# Patient Record
Sex: MALE | Race: WHITE | NOT HISPANIC OR LATINO | Employment: FULL TIME | URBAN - METROPOLITAN AREA
[De-identification: names, ages, dates, MRNs, and addresses within clinical notes are randomized per-mention and may not be internally consistent; named-entity substitution may affect disease eponyms.]

---

## 2018-02-02 ENCOUNTER — HOSPITAL ENCOUNTER (EMERGENCY)
Facility: HOSPITAL | Age: 58
Discharge: HOME | End: 2018-02-03
Attending: EMERGENCY MEDICINE
Payer: COMMERCIAL

## 2018-02-02 VITALS
BODY MASS INDEX: 36.42 KG/M2 | WEIGHT: 226.63 LBS | RESPIRATION RATE: 16 BRPM | TEMPERATURE: 98 F | HEART RATE: 63 BPM | DIASTOLIC BLOOD PRESSURE: 70 MMHG | HEIGHT: 66 IN | OXYGEN SATURATION: 97 % | SYSTOLIC BLOOD PRESSURE: 127 MMHG

## 2018-02-02 DIAGNOSIS — T14.90XA TRAUMA: ICD-10-CM

## 2018-02-02 DIAGNOSIS — S22.32XA CLOSED FRACTURE OF ONE RIB OF LEFT SIDE, INITIAL ENCOUNTER: Primary | ICD-10-CM

## 2018-02-02 PROCEDURE — 63600175 PHARM REV CODE 636 W HCPCS: Performed by: EMERGENCY MEDICINE

## 2018-02-02 PROCEDURE — 99283 EMERGENCY DEPT VISIT LOW MDM: CPT | Mod: 25

## 2018-02-02 PROCEDURE — 96372 THER/PROPH/DIAG INJ SC/IM: CPT

## 2018-02-02 RX ORDER — METFORMIN HYDROCHLORIDE 750 MG/1
750 TABLET, EXTENDED RELEASE ORAL 2 TIMES DAILY
COMMUNITY

## 2018-02-02 RX ORDER — AMLODIPINE AND OLMESARTAN MEDOXOMIL 10; 40 MG/1; MG/1
1 TABLET ORAL DAILY
COMMUNITY

## 2018-02-02 RX ORDER — OXYCODONE AND ACETAMINOPHEN 10; 325 MG/1; MG/1
1 TABLET ORAL EVERY 12 HOURS PRN
Qty: 12 TABLET | Refills: 0 | Status: SHIPPED | OUTPATIENT
Start: 2018-02-02 | End: 2018-09-24

## 2018-02-02 RX ORDER — TRIAZOLAM 0.25 MG/1
0.12 TABLET ORAL NIGHTLY PRN
COMMUNITY

## 2018-02-02 RX ORDER — HYDROMORPHONE HYDROCHLORIDE 1 MG/ML
1 INJECTION, SOLUTION INTRAMUSCULAR; INTRAVENOUS; SUBCUTANEOUS
Status: COMPLETED | OUTPATIENT
Start: 2018-02-02 | End: 2018-02-02

## 2018-02-02 RX ORDER — HYDROCODONE BITARTRATE AND ACETAMINOPHEN 10; 325 MG/1; MG/1
1 TABLET ORAL EVERY 12 HOURS PRN
Qty: 12 TABLET | Refills: 0 | Status: SHIPPED | OUTPATIENT
Start: 2018-02-02 | End: 2018-02-12

## 2018-02-02 RX ORDER — ONDANSETRON 2 MG/ML
4 INJECTION INTRAMUSCULAR; INTRAVENOUS
Status: COMPLETED | OUTPATIENT
Start: 2018-02-02 | End: 2018-02-02

## 2018-02-02 RX ORDER — NEBIVOLOL 20 MG/1
TABLET ORAL NIGHTLY
COMMUNITY

## 2018-02-02 RX ADMIN — ONDANSETRON 4 MG: 2 INJECTION, SOLUTION INTRAMUSCULAR; INTRAVENOUS at 11:02

## 2018-02-02 RX ADMIN — HYDROMORPHONE HYDROCHLORIDE 1 MG: 1 INJECTION, SOLUTION INTRAMUSCULAR; INTRAVENOUS; SUBCUTANEOUS at 11:02

## 2018-02-03 NOTE — ED PROVIDER NOTES
Encounter Date: 2/2/2018       History     Chief Complaint   Patient presents with    Fall     left rib pain, pt reports missed the last step and fell into a wall approx 5 min pta     Missed a step coming down the bottom of some stairs, fell to the left and struck a brick wall with his left shoulder and upper arm, driving his left elbow into his left low anterolateral chest.  He felt and heard a pop in that area and has significant pain in that area consistent with a rib fracture.  Minor discomfort to the left upper arm and shoulder which are not bothering him much.  No other injury or complaint.  Not driving.  Worse with chest movement, deep breaths, palpation and a pattern typical for rib fracture.       The history is provided by the patient and the spouse. No  was used.     Review of patient's allergies indicates:   Allergen Reactions    Sulfa (sulfonamide antibiotics) Itching     Past Medical History:   Diagnosis Date    Diabetes mellitus     High cholesterol     Hypertension     Insomnia      Past Surgical History:   Procedure Laterality Date    CERVICAL DISCECTOMY      KNEE ARTHROSCOPY      RHINOPLASTY       History reviewed. No pertinent family history.  Social History   Substance Use Topics    Smoking status: Never Smoker    Smokeless tobacco: Never Used    Alcohol use Yes     Review of Systems   Constitutional: Negative for chills and fever.   HENT: Negative for congestion, facial swelling, nosebleeds and sinus pressure.    Eyes: Negative for pain and redness.   Respiratory: Negative for chest tightness, shortness of breath and wheezing.    Cardiovascular: Positive for chest pain. Negative for palpitations and leg swelling.   Gastrointestinal: Negative for abdominal distention, abdominal pain, diarrhea, nausea and vomiting.   Endocrine: Negative for cold intolerance, polydipsia and polyphagia.   Genitourinary: Negative for difficulty urinating, dysuria, frequency and  hematuria.   Musculoskeletal: Negative for arthralgias, back pain, myalgias and neck pain.   Skin: Negative for color change and rash.   Neurological: Negative for dizziness, weakness, numbness and headaches.   Hematological: Negative for adenopathy. Does not bruise/bleed easily.   Psychiatric/Behavioral: Negative for agitation and behavioral problems.   All other systems reviewed and are negative.      Physical Exam     Initial Vitals [02/02/18 2245]   BP Pulse Resp Temp SpO2   (!) 144/76 70 18 98.2 °F (36.8 °C) --      MAP       98.67         Physical Exam    Nursing note and vitals reviewed.  Constitutional: He appears well-developed and well-nourished. He is not diaphoretic. No distress.   HENT:   Head: Normocephalic and atraumatic.   Mouth/Throat: Oropharynx is clear and moist. No oropharyngeal exudate.   Eyes: Conjunctivae and EOM are normal. Pupils are equal, round, and reactive to light. Right eye exhibits no discharge. Left eye exhibits no discharge. No scleral icterus.   Neck: Normal range of motion. Neck supple. No thyromegaly present. No tracheal deviation present. No JVD present.   Cardiovascular: Normal rate, regular rhythm and normal heart sounds. Exam reveals no gallop and no friction rub.    No murmur heard.  Pulmonary/Chest: Breath sounds normal. No respiratory distress. He has no wheezes. He has no rhonchi. He has no rales. He exhibits tenderness.   Left low anterolateral rib tenderness   Abdominal: Soft. Bowel sounds are normal. He exhibits no distension and no mass. There is no tenderness. There is no rebound and no guarding.   Musculoskeletal: Normal range of motion. He exhibits no edema or tenderness.   Lymphadenopathy:     He has no cervical adenopathy.   Neurological: He is alert and oriented to person, place, and time. He has normal strength. No cranial nerve deficit.   Skin: Skin is warm and dry. No rash noted. No erythema.   Psychiatric: He has a normal mood and affect. His behavior is  normal. Judgment and thought content normal.         ED Course   Procedures  Labs Reviewed - No data to display     Imaging Results          X-Ray Chest PA And Lateral (Final result)  Result time 02/02/18 23:54:39    Final result by Yehuda Lange MD (02/02/18 23:54:39)                 Impression:      No acute infiltrate.      Electronically signed by: YEHUDA LANGE MD  Date:     02/02/18  Time:    23:54              Narrative:    Exam: XR CHEST PA AND LATERAL,    Date:  02/02/18 23:36:49    History: Chest injury with chest wall pain.    Comparison:  No prior relevant studies available    Findings: Mild cardiomegaly.  Decreased lung volumes.    Clear lungs.  No pleural effusion or pneumothorax.                             X-Ray Ribs 2 View Left (Final result)  Result time 02/02/18 23:53:55    Final result by Yehuda Lange MD (02/02/18 23:53:55)                 Impression:      Probable nondisplaced left 7th rib fracture.      Electronically signed by: YEHUDA LANGE MD  Date:     02/02/18  Time:    23:53              Narrative:    Exam: XR RIBS 2 VIEW LEFT,    Date:  02/02/18 23:36:49    History: Chest wall pain.    Comparison:  No prior relevant studies available    Findings: There is a nondisplaced fracture of the left posterior lateral 7th rib.  No displaced fracture is seen.  No pleural effusion or pneumothorax.                                                   ED Course      12:02 AM Improved/ counseled pt & wife in detail.    Clinical Impression:       1. Closed fracture of one rib of left side, initial encounter    2. Trauma          Disposition:   Disposition: Discharged  Condition: Stable                        Lenny Lester MD  02/02/18 [id number]       Lenny Lester MD  02/03/18 0003

## 2018-02-03 NOTE — DISCHARGE INSTRUCTIONS
_______________    Percocet is for more severe pain.    Use both medicines as little as possible.    ________________

## 2018-02-03 NOTE — ED NOTES
LOC: The patient is awake, alert and aware of environment with an appropriate affect, the patient is oriented x 3 and speaking appropriately.  APPEARANCE: Patient resting comfortably and in no acute distress, patient is clean and well groomed, patient's clothing is properly fastened.  HEENT: Brief WNL  SKIN: Brief WNL.   MUSCULOSKELETAL: Brief WNL. Except left shoulder and rib pain. No swelling or bruising noted.   RESPIRATORY: Brief WNL  CARDIAC: Brief WNL  GASTRO: Brief WNL  : Brief WNL  Peripheral Vasc: Brief WNL  NEURO: Brief WNL  PSYCH: Brief WNL

## 2018-09-24 ENCOUNTER — HOSPITAL ENCOUNTER (EMERGENCY)
Facility: HOSPITAL | Age: 58
Discharge: HOME | End: 2018-09-25
Attending: EMERGENCY MEDICINE
Payer: COMMERCIAL

## 2018-09-24 DIAGNOSIS — R51.9 SCALP PAIN: ICD-10-CM

## 2018-09-24 DIAGNOSIS — R50.9 FEVER: ICD-10-CM

## 2018-09-24 DIAGNOSIS — M54.2 NECK PAIN: ICD-10-CM

## 2018-09-24 DIAGNOSIS — L03.811 CELLULITIS OF HEAD EXCEPT FACE: Primary | ICD-10-CM

## 2018-09-24 LAB
ALBUMIN SERPL BCP-MCNC: 4 G/DL
ALP SERPL-CCNC: 98 U/L
ALT SERPL W/O P-5'-P-CCNC: 16 U/L
ANION GAP SERPL CALC-SCNC: 14 MMOL/L
AST SERPL-CCNC: 13 U/L
BASOPHILS # BLD AUTO: 0.04 K/UL
BASOPHILS NFR BLD: 0.4 %
BILIRUB SERPL-MCNC: 1.6 MG/DL
BUN SERPL-MCNC: 14 MG/DL
CALCIUM SERPL-MCNC: 10.1 MG/DL
CHLORIDE SERPL-SCNC: 98 MMOL/L
CO2 SERPL-SCNC: 25 MMOL/L
CREAT SERPL-MCNC: 1 MG/DL
DIFFERENTIAL METHOD: ABNORMAL
EOSINOPHIL # BLD AUTO: 0.1 K/UL
EOSINOPHIL NFR BLD: 1 %
ERYTHROCYTE [DISTWIDTH] IN BLOOD BY AUTOMATED COUNT: 14 %
EST. GFR  (AFRICAN AMERICAN): >60 ML/MIN/1.73 M^2
EST. GFR  (NON AFRICAN AMERICAN): >60 ML/MIN/1.73 M^2
GLUCOSE SERPL-MCNC: 194 MG/DL
HCT VFR BLD AUTO: 41.2 %
HGB BLD-MCNC: 13.7 G/DL
LACTATE SERPL-SCNC: 2.6 MMOL/L
LYMPHOCYTES # BLD AUTO: 1.3 K/UL
LYMPHOCYTES NFR BLD: 10.9 %
MCH RBC QN AUTO: 28.1 PG
MCHC RBC AUTO-ENTMCNC: 33.3 G/DL
MCV RBC AUTO: 84 FL
MONOCYTES # BLD AUTO: 1 K/UL
MONOCYTES NFR BLD: 9 %
NEUTROPHILS # BLD AUTO: 9 K/UL
NEUTROPHILS NFR BLD: 78.3 %
PLATELET # BLD AUTO: 244 K/UL
PMV BLD AUTO: 10.4 FL
POTASSIUM SERPL-SCNC: 3.7 MMOL/L
PROCALCITONIN SERPL IA-MCNC: 0.03 NG/ML
PROT SERPL-MCNC: 8 G/DL
RBC # BLD AUTO: 4.88 M/UL
SODIUM SERPL-SCNC: 137 MMOL/L
WBC # BLD AUTO: 11.42 K/UL

## 2018-09-24 PROCEDURE — 96365 THER/PROPH/DIAG IV INF INIT: CPT

## 2018-09-24 PROCEDURE — 83605 ASSAY OF LACTIC ACID: CPT

## 2018-09-24 PROCEDURE — 99285 EMERGENCY DEPT VISIT HI MDM: CPT | Mod: 25

## 2018-09-24 PROCEDURE — 96375 TX/PRO/DX INJ NEW DRUG ADDON: CPT

## 2018-09-24 PROCEDURE — 96366 THER/PROPH/DIAG IV INF ADDON: CPT

## 2018-09-24 PROCEDURE — 93010 ELECTROCARDIOGRAM REPORT: CPT | Mod: ,,, | Performed by: NUCLEAR MEDICINE

## 2018-09-24 PROCEDURE — 96368 THER/DIAG CONCURRENT INF: CPT

## 2018-09-24 PROCEDURE — 84145 PROCALCITONIN (PCT): CPT

## 2018-09-24 PROCEDURE — 80053 COMPREHEN METABOLIC PANEL: CPT

## 2018-09-24 PROCEDURE — 87086 URINE CULTURE/COLONY COUNT: CPT

## 2018-09-24 PROCEDURE — 25000003 PHARM REV CODE 250: Performed by: EMERGENCY MEDICINE

## 2018-09-24 PROCEDURE — 99900035 HC TECH TIME PER 15 MIN (STAT)

## 2018-09-24 PROCEDURE — 96367 TX/PROPH/DG ADDL SEQ IV INF: CPT

## 2018-09-24 PROCEDURE — 85025 COMPLETE CBC W/AUTO DIFF WBC: CPT

## 2018-09-24 PROCEDURE — 87040 BLOOD CULTURE FOR BACTERIA: CPT

## 2018-09-24 PROCEDURE — 81003 URINALYSIS AUTO W/O SCOPE: CPT

## 2018-09-24 PROCEDURE — 63600175 PHARM REV CODE 636 W HCPCS: Performed by: EMERGENCY MEDICINE

## 2018-09-24 PROCEDURE — 93005 ELECTROCARDIOGRAM TRACING: CPT

## 2018-09-24 RX ORDER — ACETAMINOPHEN 325 MG/1
650 TABLET ORAL
Status: COMPLETED | OUTPATIENT
Start: 2018-09-24 | End: 2018-09-24

## 2018-09-24 RX ORDER — INSULIN GLARGINE 100 [IU]/ML
INJECTION, SOLUTION SUBCUTANEOUS NIGHTLY
COMMUNITY
Start: 2015-07-28

## 2018-09-24 RX ORDER — IBUPROFEN 200 MG
600 TABLET ORAL
Status: COMPLETED | OUTPATIENT
Start: 2018-09-24 | End: 2018-09-24

## 2018-09-24 RX ORDER — CIPROFLOXACIN 2 MG/ML
400 INJECTION, SOLUTION INTRAVENOUS
Status: COMPLETED | OUTPATIENT
Start: 2018-09-24 | End: 2018-09-24

## 2018-09-24 RX ORDER — PIOGLITAZONE HCL AND METFORMIN HCL 850; 15 MG/1; MG/1
1 TABLET ORAL 2 TIMES DAILY
COMMUNITY
Start: 2018-03-20

## 2018-09-24 RX ORDER — ONDANSETRON 2 MG/ML
4 INJECTION INTRAMUSCULAR; INTRAVENOUS
Status: COMPLETED | OUTPATIENT
Start: 2018-09-24 | End: 2018-09-24

## 2018-09-24 RX ORDER — MORPHINE SULFATE 4 MG/ML
4 INJECTION, SOLUTION INTRAMUSCULAR; INTRAVENOUS
Status: COMPLETED | OUTPATIENT
Start: 2018-09-24 | End: 2018-09-24

## 2018-09-24 RX ORDER — ATORVASTATIN CALCIUM 40 MG/1
40 TABLET, FILM COATED ORAL DAILY
COMMUNITY
Start: 2017-08-08

## 2018-09-24 RX ORDER — ESCITALOPRAM OXALATE 20 MG/1
1 TABLET ORAL DAILY
COMMUNITY
Start: 2018-09-17

## 2018-09-24 RX ORDER — ASPIRIN 81 MG/1
81 TABLET ORAL DAILY
COMMUNITY

## 2018-09-24 RX ORDER — VANCOMYCIN HCL IN 5 % DEXTROSE 1G/250ML
1000 PLASTIC BAG, INJECTION (ML) INTRAVENOUS ONCE
Status: COMPLETED | OUTPATIENT
Start: 2018-09-24 | End: 2018-09-24

## 2018-09-24 RX ADMIN — CIPROFLOXACIN 400 MG: 2 INJECTION, SOLUTION INTRAVENOUS at 08:09

## 2018-09-24 RX ADMIN — VANCOMYCIN HYDROCHLORIDE 1000 MG: 1 INJECTION, POWDER, LYOPHILIZED, FOR SOLUTION INTRAVENOUS at 09:09

## 2018-09-24 RX ADMIN — ONDANSETRON 4 MG: 2 INJECTION, SOLUTION INTRAMUSCULAR; INTRAVENOUS at 06:09

## 2018-09-24 RX ADMIN — ACETAMINOPHEN 650 MG: 325 TABLET, FILM COATED ORAL at 08:09

## 2018-09-24 RX ADMIN — MORPHINE SULFATE 4 MG: 4 INJECTION INTRAVENOUS at 06:09

## 2018-09-24 RX ADMIN — SODIUM CHLORIDE 2973 ML: 0.9 INJECTION, SOLUTION INTRAVENOUS at 08:09

## 2018-09-24 RX ADMIN — IBUPROFEN 600 MG: 200 TABLET, FILM COATED ORAL at 09:09

## 2018-09-24 RX ADMIN — PIPERACILLIN SODIUM AND TAZOBACTAM SODIUM 4.5 G: 4; .5 INJECTION, POWDER, LYOPHILIZED, FOR SOLUTION INTRAVENOUS at 09:09

## 2018-09-25 VITALS
RESPIRATION RATE: 20 BRPM | OXYGEN SATURATION: 96 % | SYSTOLIC BLOOD PRESSURE: 126 MMHG | HEIGHT: 66 IN | WEIGHT: 218.38 LBS | HEART RATE: 66 BPM | BODY MASS INDEX: 35.1 KG/M2 | TEMPERATURE: 99 F | DIASTOLIC BLOOD PRESSURE: 65 MMHG

## 2018-09-25 LAB
BILIRUB UR QL STRIP: NEGATIVE
CLARITY UR REFRACT.AUTO: CLEAR
COLOR UR AUTO: YELLOW
GLUCOSE UR QL STRIP: ABNORMAL
HGB UR QL STRIP: ABNORMAL
KETONES UR QL STRIP: NEGATIVE
LACTATE SERPL-SCNC: 1.9 MMOL/L
LEUKOCYTE ESTERASE UR QL STRIP: NEGATIVE
NITRITE UR QL STRIP: NEGATIVE
PH UR STRIP: 6 [PH] (ref 5–8)
PROT UR QL STRIP: NEGATIVE
SP GR UR STRIP: 1.02 (ref 1–1.03)
URN SPEC COLLECT METH UR: ABNORMAL
UROBILINOGEN UR STRIP-ACNC: NEGATIVE EU/DL

## 2018-09-25 PROCEDURE — 25000003 PHARM REV CODE 250: Performed by: EMERGENCY MEDICINE

## 2018-09-25 PROCEDURE — 83605 ASSAY OF LACTIC ACID: CPT

## 2018-09-25 RX ORDER — CLINDAMYCIN HYDROCHLORIDE 300 MG/1
300 CAPSULE ORAL EVERY 8 HOURS
Qty: 30 CAPSULE | Refills: 0 | Status: ON HOLD | OUTPATIENT
Start: 2018-09-25 | End: 2020-02-04 | Stop reason: HOSPADM

## 2018-09-25 RX ORDER — MUPIROCIN 20 MG/G
OINTMENT TOPICAL 3 TIMES DAILY
Qty: 30 G | Refills: 0 | Status: SHIPPED | OUTPATIENT
Start: 2018-09-25 | End: 2018-09-30

## 2018-09-25 RX ORDER — MUPIROCIN 20 MG/G
OINTMENT TOPICAL
Status: COMPLETED | OUTPATIENT
Start: 2018-09-25 | End: 2018-09-25

## 2018-09-25 RX ADMIN — MUPIROCIN: 20 OINTMENT TOPICAL at 02:09

## 2018-09-25 NOTE — ED NOTES
Patient reports feeling better. IV meds infusing WNL. No S/S of RXN noted. SR remains on cardiac monitor.

## 2018-09-25 NOTE — ED NOTES
Patient resting intermittently. Family remains at bedside. SR noted on cardiac monitor. Will continue to monitor patient.

## 2018-09-25 NOTE — ED PROVIDER NOTES
"Encounter Date: 9/24/2018       History     Chief Complaint   Patient presents with    Facial Pain     Reports "bump" to L side of scalp and now having L sided facial pain. C/o headache and nausea.      6:08 PM     The patient is a 58-year-old gentleman presenting to the emergency room complaining of left-sided scalp pain. Patient reports that the left side of his scalp is tender.  He notes that it is swollen.  Patient states that the top of the scalp hurts, it radiates past his ear and down his neck.  He states that he started feeling bad 3 days prior on Friday.  He had a gradual frontal headache.  It was associated with stuffy nose. He states that his ear is hurting him.  Then today, the pain worsened.  It is located over the left parietal region.  Pain is rated as 5/10.  Patient tried some ibuprofen after lunch today to help with the pain. Patient denies any fever, chills, body ache, dental pain, rhinorrhea, sore throat, trauma, jaw claudication, visual changes, numbness or weakness to 1 side, chest pain, chest pressure, shortness of breath, difficulty breathing, or cough.  Patient's past medical history is significant for diabetes.          Review of patient's allergies indicates:   Allergen Reactions    Sulfa (sulfonamide antibiotics) Itching     Past Medical History:   Diagnosis Date    Diabetes mellitus     High cholesterol     Hypertension     Insomnia      Past Surgical History:   Procedure Laterality Date    CERVICAL DISCECTOMY      KNEE ARTHROSCOPY      RHINOPLASTY       No family history on file.  Social History     Tobacco Use    Smoking status: Never Smoker    Smokeless tobacco: Never Used   Substance Use Topics    Alcohol use: Yes    Drug use: No     Review of Systems   Constitutional: Negative for fever.   HENT: Positive for congestion. Negative for sore throat.         Denies visual changes or jaw claudication.   Respiratory: Negative for cough, chest tightness and shortness of " "breath.    Cardiovascular: Negative for chest pain.   Gastrointestinal: Negative for abdominal pain, nausea and vomiting.   Genitourinary: Negative for dysuria and hematuria.   Musculoskeletal: Negative for back pain.   Skin: Negative for rash.   Neurological: Positive for headaches (Left-sided scalp pain). Negative for weakness.   Hematological: Does not bruise/bleed easily.       Physical Exam     Initial Vitals [09/24/18 1804]   BP Pulse Resp Temp SpO2   (!) 202/101 69 18 98.7 °F (37.1 °C) 100 %      MAP       --         Vitals:    09/24/18 1804 09/24/18 1810 09/24/18 1901 09/24/18 1931   BP: (!) 202/101  (!) 177/86 (!) 177/85   Pulse: 69 68 75 81   Resp: 18  16 14   Temp: 98.7 °F (37.1 °C)  98.6 °F (37 °C) 98.6 °F (37 °C)   TempSrc: Oral  Oral Oral   SpO2: 100%  100% 98%   Weight: 99 kg (218 lb 5.9 oz)      Height: 5' 6" (1.676 m)       09/24/18 2001 09/24/18 2038 09/24/18 2100 09/24/18 2134   BP: (!) 155/83      Pulse: 81      Resp: 15  20    Temp: (!) 101 °F (38.3 °C) (!) 101 °F (38.3 °C) (!) 101.6 °F (38.7 °C) (!) 101.6 °F (38.7 °C)   TempSrc: Oral  Oral    SpO2: 98%  95%    Weight:       Height:        09/24/18 2146 09/24/18 2201 09/24/18 2214 09/24/18 2232   BP: (!) 156/73 (!) 151/65  (!) 148/66   Pulse: 88 89  84   Resp: 20 15  15   Temp: (!) 101.6 °F (38.7 °C)  (!) 100.7 °F (38.2 °C) 100 °F (37.8 °C)   TempSrc: Oral  Oral Oral   SpO2: 95% 95%  95%   Weight:       Height:        09/24/18 2301 09/25/18 0002 09/25/18 0154   BP: 133/84 121/64 117/62   Pulse: 80 72 65   Resp: 16 16 20   Temp: 99.6 °F (37.6 °C) 99.5 °F (37.5 °C)    TempSrc: Oral Oral    SpO2: 98% 96% 96%   Weight:      Height:          Physical Exam     Nursing Notes and Vital Signs Reviewed.  Constitutional:  Well developed, well nourished.  He is awake & alert.  He is in mild distress. Patient appears to be in pain.  Head:  Atraumatic.  Normocephalic. There is 2 slight abrasions to the left parietal region.  No crepitance noted, no vesicular " lesions noted.  Frontal sinus, maxillary sinus nontender to palpation.  Eyes:  PERRL. EOMI.  Conjunctivae are not pale. No scleral icterus.  No photophobia or ptosis noted   ENT:  Mucous membranes are moist and intact.  Oropharynx is clear and symmetric.  Tympanic membranes normal in appearance bilaterally. No pain with movement of the tragus.  Parotid gland nontender.  Or colles were normal in appearance.  No pain with movement of the tragus.  External auditory canal normal.  Neck:  Supple. Full ROM.  No lymphadenopathy.  There is tenderness palpation along the sternal cleared mastoid muscle.  No crepitance noted.  No bruits noted bilaterally.  Cardiovascular:  Regular rate.  Regular rhythm. S1 and S2 heart sounds present.  No murmurs, rubs, or gallops.  Distal pulses are 2+ and symmetric.  Pulmonary/Chest:  No evidence of respiratory distress.  Clear to auscultation bilaterally.  No wheezing, rales or rhonchi.  Abdominal:  Soft and non-distended.  There is no tenderness.  No rebound, guarding, or rigidity.  Good bowel sounds.  No organomegaly.    Genitourinary: No CVA tenderness.  Musculoskeletal:  Moves all four extremities. No obvious deformities.  No edema. No calf tenderness.    Skin:  Skin is warm and dry. No rashes.      Neurological:  Alert, awake, and appropriate.  Normal speech.  No acute focal neurological deficits are appreciated. Cranial nerves 2 through 12 intact.  Finger-to-nose intact. Speech clear.  Visual fields intact.  Negative pronator drift.  Negative heel drop.  Psychiatric:  Good eye contact.  Appropriate in content/context. Normal affect.       Scalp on presentation to the ED.        ED Course   Critical Care  Date/Time: 9/24/2018 6:18 AM  Performed by: Nikky Bundy DO  Authorized by: Nikky Bundy DO   Direct patient critical care time: 15 minutes  Additional history critical care time: 10 minutes  Ordering / reviewing critical care time: 5 minutes  Documentation critical care  time: 10 minutes  Other critical care time: 5 minutes  Total critical care time (exclusive of procedural time) : 45 minutes  Critical care time was exclusive of separately billable procedures and treating other patients.  Critical care was necessary to treat or prevent imminent or life-threatening deterioration of the following conditions: sepsis.  Critical care was time spent personally by me on the following activities: blood draw for specimens, discussions with consultants, discussions with primary provider, evaluation of patient's response to treatment, examination of patient, obtaining history from patient or surrogate, ordering and performing treatments and interventions, ordering and review of laboratory studies, ordering and review of radiographic studies, pulse oximetry, re-evaluation of patient's condition and vascular access procedures.        Labs Reviewed   CBC W/ AUTO DIFFERENTIAL - Abnormal; Notable for the following components:       Result Value    Hemoglobin 13.7 (*)     Gran # (ANC) 9.0 (*)     Gran% 78.3 (*)     Lymph% 10.9 (*)     All other components within normal limits   COMPREHENSIVE METABOLIC PANEL - Abnormal; Notable for the following components:    Glucose 194 (*)     Total Bilirubin 1.6 (*)     All other components within normal limits   LACTIC ACID, PLASMA - Abnormal; Notable for the following components:    Lactate (Lactic Acid) 2.6 (*)     All other components within normal limits   URINALYSIS - Abnormal; Notable for the following components:    Glucose, UA 1+ (*)     Occult Blood UA Trace (*)     All other components within normal limits   CULTURE, BLOOD   CULTURE, BLOOD   CULTURE, URINE   PROCALCITONIN   PROCALCITONIN   LACTIC ACID, PLASMA     EKG Readings: (Independently Interpreted)   EKG:  Time 6:37 p.m..  Sinus rhythm.  Rate 70 beats per minute.  Left axis deviation.  No acute ST or T-wave changes noted.   Other EKG Interpretations: Repeat EK:44.  Normal sinus rhythm.   Rate 91 beats per minute.  Left axis deviation.  No acute ST or T-wave changes noted.        Results for orders placed or performed during the hospital encounter of 09/24/18   CBC auto differential   Result Value Ref Range    WBC 11.42 3.90 - 12.70 K/uL    RBC 4.88 4.60 - 6.20 M/uL    Hemoglobin 13.7 (L) 14.0 - 18.0 g/dL    Hematocrit 41.2 40.0 - 54.0 %    MCV 84 82 - 98 fL    MCH 28.1 27.0 - 31.0 pg    MCHC 33.3 32.0 - 36.0 g/dL    RDW 14.0 11.5 - 14.5 %    Platelets 244 150 - 350 K/uL    MPV 10.4 9.2 - 12.9 fL    Gran # (ANC) 9.0 (H) 1.8 - 7.7 K/uL    Lymph # 1.3 1.0 - 4.8 K/uL    Mono # 1.0 0.3 - 1.0 K/uL    Eos # 0.1 0.0 - 0.5 K/uL    Baso # 0.04 0.00 - 0.20 K/uL    Gran% 78.3 (H) 38.0 - 73.0 %    Lymph% 10.9 (L) 18.0 - 48.0 %    Mono% 9.0 4.0 - 15.0 %    Eosinophil% 1.0 0.0 - 8.0 %    Basophil% 0.4 0.0 - 1.9 %    Differential Method Automated    Comprehensive metabolic panel   Result Value Ref Range    Sodium 137 136 - 145 mmol/L    Potassium 3.7 3.5 - 5.1 mmol/L    Chloride 98 95 - 110 mmol/L    CO2 25 23 - 29 mmol/L    Glucose 194 (H) 70 - 110 mg/dL    BUN, Bld 14 6 - 20 mg/dL    Creatinine 1.0 0.5 - 1.4 mg/dL    Calcium 10.1 8.7 - 10.5 mg/dL    Total Protein 8.0 6.0 - 8.4 g/dL    Albumin 4.0 3.5 - 5.2 g/dL    Total Bilirubin 1.6 (H) 0.1 - 1.0 mg/dL    Alkaline Phosphatase 98 55 - 135 U/L    AST 13 10 - 40 U/L    ALT 16 10 - 44 U/L    Anion Gap 14 8 - 16 mmol/L    eGFR if African American >60.0 >60 mL/min/1.73 m^2    eGFR if non African American >60.0 >60 mL/min/1.73 m^2   Lactic acid, plasma   Result Value Ref Range    Lactate (Lactic Acid) 2.6 (H) 0.5 - 2.2 mmol/L   Urinalysis   Result Value Ref Range    Specimen UA Urine, Clean Catch     Color, UA Yellow Yellow, Straw, Olena    Appearance, UA Clear Clear    pH, UA 6.0 5.0 - 8.0    Specific Gravity, UA 1.020 1.005 - 1.030    Protein, UA Negative Negative    Glucose, UA 1+ (A) Negative    Ketones, UA Negative Negative    Bilirubin (UA) Negative Negative     Occult Blood UA Trace (A) Negative    Nitrite, UA Negative Negative    Urobilinogen, UA Negative <2.0 EU/dL    Leukocytes, UA Negative Negative   Procalcitonin   Result Value Ref Range    Procalcitonin 0.03 <0.25 ng/mL   Lactic acid, plasma   Result Value Ref Range    Lactate (Lactic Acid) 1.9 0.5 - 2.2 mmol/L       Imaging Results          X-Ray Chest AP Portable (Final result)  Result time 09/24/18 21:08:52    Final result by Cristóbal Campbell MD (09/24/18 21:08:52)                 Impression:      No acute findings.  Cardiomegaly.      Electronically signed by: Cristóbal Campbell MD  Date:    09/24/2018  Time:    21:08             Narrative:    EXAMINATION:  XR CHEST AP PORTABLE    CLINICAL HISTORY:  Fever.,    COMPARISON:  02/02/2018.    FINDINGS:  Mild-to-moderate cardiomegaly.  Mild aortic enlargement.  No change.    The lung fields remain clear.                               US Carotid Bilateral (Final result)  Result time 09/24/18 19:40:38    Final result by Connor Aleman MD (09/24/18 19:40:38)                 Narrative:    EXAMINATION:  US CAROTID BILATERAL    CLINICAL HISTORY:  Cervicalgia    TECHNIQUE:  Grayscale and color Doppler ultrasound examination of the carotid and vertebral artery systems bilaterally.  Stenosis estimates are per the NASCET measurement criteria.    COMPARISON:  None.    FINDINGS:  Right: Mild noncalcified plaque proximal ICA.  No elevated peak systolic velocity.  The maximal ICA velocity 58 centimeters/second.  ICA/CCA ratio 0.6.  Antegrade vertebral artery.    Left: Mild noncalcified plaque bifurcation and proximal ICA.  No elevated peak systolic velocity.  Maximal ICA velocity 92 centimeters/second.  ICA/CCA ratio 0.9.  Antegrade flow vertebral artery.      No evidence of a hemodynamically significant carotid bifurcation stenosis.    Validated velocity measurements with angiographic measurements, velocity criteria are extrapolated from diameter data as defined by the Society  of Radiologists Ultrasound Consensus Conference Radiology 2003; 229; 329; 340-346.      Electronically signed by: Connor Aleman MD  Date:    09/24/2018  Time:    19:40                             CT Head Without Contrast (Final result)  Result time 09/24/18 18:59:12    Final result by Connor Aleman MD (09/24/18 18:59:12)                 Impression:      Intracranially negative head CT.    All CT scans at this facility are performed  using dose modulation techniques as appropriate to performed exam including the following:  automated exposure control; adjustment of mA and/or kV according to the patients size (this includes techniques or standardized protocols for targeted exams where dose is matched to indication/reason for exam: i.e. extremities or head);  iterative reconstruction technique.      Electronically signed by: Connor Aleman MD  Date:    09/24/2018  Time:    18:59             Narrative:    EXAMINATION:  CT HEAD WITHOUT CONTRAST    CLINICAL HISTORY:  left temporal scalp pain, patient with high blood pressure 215/101, no neurologic symptoms.; Headache    TECHNIQUE:  Routine noncontrast head CT.    COMPARISON:  None    FINDINGS:  There is no acute intracranial hemorrhage or abnormal extra-axial fluid collection.  There is no abnormal increased or decreased density within the brain parenchyma.  Gray-white differentiation preserved.  Normal ventricles.  There is no intracranial mass or mass effect.  The calvarium is intact.  Visualized paranasal sinuses and mastoids are well aerated other than a small 0.8 cm mucous retention cyst involving a posterior right ethmoid air cell.  The frontal sinuses are absent.                                ED Course:  7:50 PM results of tests were discussed with patient and family member.      8:11 PM Patient developed a fever of 101.  Patient appears to have rigors.  Patient is complaining now of severe pain to the ear and anterior ear.  Differential considerations include  peritonitis/malignant otitis externa.  There is no rash that is present at the moment or vesicles.  Will continue to monitor.    8:17 PM sepsis order sheet not initially initiated as patient did not present as septic on 1st encounter..      9:04 PM patient now developed warmth and erythema to the left side of the skull.  This is not present earlier.  Of note, patient has not received vancomycin yet.  Therefore red man syndrome unlikely.          9:33 PM  Fluid challenge completed.  Vital signs have been reviewed.  A focused perfusion assessment (septic focused exam) was completed.     \    1:43 AM repeat lactic normal.  Results for BRYANNA VALLEJO (MRN [id number]) as of 9/25/2018 01:43   Ref. Range 9/24/2018 20:30 9/25/2018 00:38   Lactate, Ronnie Latest Ref Range: 0.5 - 2.2 mmol/L 2.6 (H) 1.9       1:54 AM patient reports that he is feeling comfortable.  He is awake alert oriented.  Vital signs have remained stable. I discussed with them in detail to return for redness of the ear, swelling of the ear, confusion, weakness, worsening condition.  Patient and spouse verbalized understanding.       Medications   mupirocin 2 % ointment (not administered)   morphine injection 4 mg (4 mg Intravenous Given 9/24/18 1832)   ondansetron injection 4 mg (4 mg Intravenous Given 9/24/18 1833)   acetaminophen tablet 650 mg (650 mg Oral Given 9/24/18 2038)   ciprofloxacin (CIPRO)400mg/200ml D5W IVPB 400 mg (0 mg Intravenous Stopped 9/24/18 2137)   sodium chloride 0.9% bolus 2,973 mL (0 mL/kg × 99.1 kg Intravenous Stopped 9/24/18 2126)   vancomycin in dextrose 5 % 1 gram/250 mL IVPB 1,000 mg (0 mg Intravenous Stopped 9/24/18 2312)   piperacillin-tazobactam 4.5 g in dextrose 5 % 100 mL IVPB (ready to mix system) (0 g Intravenous Stopped 9/24/18 2210)   ibuprofen tablet 600 mg (600 mg Oral Given 9/24/18 2134)       1:58 AM Reassessment: Dr. Bundy reassessed the pt.  The pt is resting comfortably and is NAD.  Pt states their sx have  improved. Discussed test results, shared treatment plan, specific conditions for return, and the need for f/u.  Answered their questions at this time.  Pt understands and agrees to the plan.  The pt has remained hemodynamically stable through ED course and is stable for discharge.    Follow-up Information     Leon Mitchell MD In 2 days.    Specialty:  Internal Medicine  Why:  Return to emergency department for:  Confusion, severe pain, blister formation, redness of the ear, passing out, temperature persisting greater than 48 hr after antibiotics, or other concerns.  Contact information:  [address] [facility] [place] [address]  [phone]                      Medication List      START taking these medications    clindamycin 300 MG capsule  Commonly known as:  CLEOCIN  Take 1 capsule (300 mg total) by mouth every 8 (eight) hours.     mupirocin 2 % ointment  Commonly known as:  BACTROBAN  Apply topically 3 (three) times daily. for 5 days        ASK your doctor about these medications    amlodipine-olmesartan 10-40 mg per tablet  Commonly known as:  IAN     aspirin 81 MG EC tablet  Commonly known as:  ECOTRIN     atorvastatin 40 MG tablet  Commonly known as:  LIPITOR     BYSTOLIC 20 mg Tab  Generic drug:  nebivolol     escitalopram oxalate 20 MG tablet  Commonly known as:  LEXAPRO     insulin glargine 100 unit/mL (3 mL) Inpn pen  Commonly known as:  LANTUS SOLOSTAR     metFORMIN 750 MG 24 hr tablet  Commonly known as:  GLUCOPHAGE-XR     pioglitazone-metformin  mg per tablet  Commonly known as:  ACTOPLUS MET     triazolam 0.25 MG Tab  Commonly known as:  HALCION           Where to Get Your Medications      You can get these medications from any pharmacy    Bring a paper prescription for each of these medications  · clindamycin 300 MG capsule  · mupirocin 2 % ointment         I discussed with patient and/or family/caretaker that evaluation in the ED does not suggest any emergent or life threatening  medical conditions requiring immediate intervention beyond what was provided in the ED, and I believe patient is safe for discharge.  Regardless, an unremarkable evaluation in the ED does not preclude the development or presence of a serious of life threatening condition. As such, patient was instructed to return immediately for any worsening or change in current symptoms.    Pre-hypertension/Hypertension: The pt has been informed that they may have pre-hypertension or hypertension based on a blood pressure reading in the ED. I recommend that the pt call the PCP listed on their discharge instructions or a physician of their choice this week to arrange f/u for further evaluation of possible pre-hypertension or hypertension.       Medical Decision Making:   Initial Assessment:   Patient is a 58-year-old diabetic presenting to the emergency room with left-sided scalp pain. On initial presentation there is just 2 small superficial abrasions noted to the left parietal region.  Patient notes that he has anterior left ear pain with it as well as left anterior neck discomfort.  No trauma.  Differential Diagnosis:   Sinusitis, parotitis, malignant external otitis, cellulitis, shingles, carotid artery dissection, intracranial mass, subarachnoid hemorrhage  Independently Interpreted Test(s):   I have ordered and independently interpreted EKG Reading(s) - see prior notes  Clinical Tests:   Lab Tests: Ordered and Reviewed  Radiological Study: Ordered and Reviewed  Medical Tests: Ordered and Reviewed  ED Management:  Patient presented to the emergency room.  No SIRS criteria noted at that time.  EKG was obtained to exclude ACS.  Patient underwent CBC, head CT, ultrasound of the carotid to exclude subarachnoid hemorrhage, cranial mass, the and carotid dissection.  Patient was given analgesic pain medication.  On review of his labs, patient developed a fever of 101.0.t that time blood cultures were obtained. Given 30 mL/kilos fluid  bolus, lactic acid.  Still no skin changes noted.  Patient was started on vancomycin and Cipro to treat Parotitis and malignant otitis externa.  At 9:04 PM Patient did develop warmth erythema in redness to the left side of the parietal region.  Zosyn was added to the regimen.  Patient is procalcitonin was normal. Lactic acid was slightly elevated at .4, repeat lactic acid showed normalization of value.  Patient's vital signs remained stable in the emergency room.  He is awake alert oriented no acute distress. Patient's clinical course did declare cellulitis of the scalp.  Patient was nontoxic.  Patient was able ambulate out of the emergency room without difficulty.         Clinical Impression:       ICD-10-CM ICD-9-CM   1. Cellulitis of head except face L03.811 682.8   2. Scalp pain R51 784.0   3. Neck pain M54.2 723.1   4. Fever R50.9 780.60           Disposition:   Disposition: Discharged  Condition: Stable                        Nikky Bundy,   09/25/18 0542

## 2018-09-26 LAB — BACTERIA UR CULT: NO GROWTH

## 2018-09-30 LAB
BACTERIA BLD CULT: NORMAL
BACTERIA BLD CULT: NORMAL

## 2020-02-02 ENCOUNTER — HOSPITAL ENCOUNTER (OUTPATIENT)
Facility: HOSPITAL | Age: 60
Discharge: HOME | End: 2020-02-04
Attending: EMERGENCY MEDICINE | Admitting: FAMILY MEDICINE
Payer: COMMERCIAL

## 2020-02-02 DIAGNOSIS — R06.02 SOB (SHORTNESS OF BREATH): ICD-10-CM

## 2020-02-02 DIAGNOSIS — R73.9 HYPERGLYCEMIA: ICD-10-CM

## 2020-02-02 DIAGNOSIS — J93.9 PNEUMOTHORAX ON LEFT: ICD-10-CM

## 2020-02-02 DIAGNOSIS — R91.1 PULMONARY NODULE: ICD-10-CM

## 2020-02-02 DIAGNOSIS — R06.00 DYSPNEA: ICD-10-CM

## 2020-02-02 DIAGNOSIS — J18.9 PNEUMONIA OF BOTH LUNGS DUE TO INFECTIOUS ORGANISM, UNSPECIFIED PART OF LUNG: ICD-10-CM

## 2020-02-02 DIAGNOSIS — I71.20 THORACIC AORTIC ANEURYSM WITHOUT RUPTURE: Primary | ICD-10-CM

## 2020-02-02 DIAGNOSIS — J93.9 PNEUMOTHORAX: ICD-10-CM

## 2020-02-02 LAB
BASOPHILS # BLD AUTO: 0.03 K/UL (ref 0–0.2)
BASOPHILS NFR BLD: 0.4 % (ref 0–1.9)
DIFFERENTIAL METHOD: ABNORMAL
EOSINOPHIL # BLD AUTO: 0.1 K/UL (ref 0–0.5)
EOSINOPHIL NFR BLD: 1.7 % (ref 0–8)
ERYTHROCYTE [DISTWIDTH] IN BLOOD BY AUTOMATED COUNT: 13.6 % (ref 11.5–14.5)
HCT VFR BLD AUTO: 39 % (ref 40–54)
HGB BLD-MCNC: 12.5 G/DL (ref 14–18)
IMM GRANULOCYTES # BLD AUTO: 0.04 K/UL (ref 0–0.04)
IMM GRANULOCYTES NFR BLD AUTO: 0.5 % (ref 0–0.5)
LYMPHOCYTES # BLD AUTO: 0.8 K/UL (ref 1–4.8)
LYMPHOCYTES NFR BLD: 9.6 % (ref 18–48)
MCH RBC QN AUTO: 27.7 PG (ref 27–31)
MCHC RBC AUTO-ENTMCNC: 32.1 G/DL (ref 32–36)
MCV RBC AUTO: 87 FL (ref 82–98)
MONOCYTES # BLD AUTO: 0.8 K/UL (ref 0.3–1)
MONOCYTES NFR BLD: 9.9 % (ref 4–15)
NEUTROPHILS # BLD AUTO: 6.5 K/UL (ref 1.8–7.7)
NEUTROPHILS NFR BLD: 77.9 % (ref 38–73)
NRBC BLD-RTO: 0 /100 WBC
PLATELET # BLD AUTO: 265 K/UL (ref 150–350)
PMV BLD AUTO: 10.1 FL (ref 9.2–12.9)
RBC # BLD AUTO: 4.51 M/UL (ref 4.6–6.2)
WBC # BLD AUTO: 8.32 K/UL (ref 3.9–12.7)

## 2020-02-02 PROCEDURE — 96375 TX/PRO/DX INJ NEW DRUG ADDON: CPT | Mod: ER

## 2020-02-02 PROCEDURE — 85025 COMPLETE CBC W/AUTO DIFF WBC: CPT | Mod: ER

## 2020-02-02 PROCEDURE — 93010 EKG 12-LEAD: ICD-10-PCS | Mod: ,,, | Performed by: INTERNAL MEDICINE

## 2020-02-02 PROCEDURE — 80053 COMPREHEN METABOLIC PANEL: CPT | Mod: ER

## 2020-02-02 PROCEDURE — 99291 CRITICAL CARE FIRST HOUR: CPT | Mod: ER

## 2020-02-02 PROCEDURE — 84484 ASSAY OF TROPONIN QUANT: CPT | Mod: ER

## 2020-02-02 PROCEDURE — 87040 BLOOD CULTURE FOR BACTERIA: CPT

## 2020-02-02 PROCEDURE — 93010 ELECTROCARDIOGRAM REPORT: CPT | Mod: ,,, | Performed by: INTERNAL MEDICINE

## 2020-02-02 PROCEDURE — 83880 ASSAY OF NATRIURETIC PEPTIDE: CPT | Mod: ER

## 2020-02-02 PROCEDURE — 96365 THER/PROPH/DIAG IV INF INIT: CPT | Mod: ER

## 2020-02-02 PROCEDURE — 93005 ELECTROCARDIOGRAM TRACING: CPT | Mod: ER

## 2020-02-03 PROBLEM — I10 ESSENTIAL HYPERTENSION: Status: ACTIVE | Noted: 2020-02-03

## 2020-02-03 PROBLEM — J18.9 COMMUNITY ACQUIRED PNEUMONIA: Status: ACTIVE | Noted: 2020-02-03

## 2020-02-03 PROBLEM — J93.9 PNEUMOTHORAX: Status: ACTIVE | Noted: 2020-02-03

## 2020-02-03 PROBLEM — Z79.4 TYPE 2 DIABETES MELLITUS, WITH LONG-TERM CURRENT USE OF INSULIN: Status: ACTIVE | Noted: 2020-02-03

## 2020-02-03 PROBLEM — E11.9 TYPE 2 DIABETES MELLITUS, WITH LONG-TERM CURRENT USE OF INSULIN: Status: ACTIVE | Noted: 2020-02-03

## 2020-02-03 LAB
ALBUMIN SERPL BCP-MCNC: 3.8 G/DL (ref 3.5–5.2)
ALP SERPL-CCNC: 95 U/L (ref 55–135)
ALT SERPL W/O P-5'-P-CCNC: 16 U/L (ref 10–44)
ANION GAP SERPL CALC-SCNC: 13 MMOL/L (ref 8–16)
AST SERPL-CCNC: 14 U/L (ref 10–40)
BILIRUB SERPL-MCNC: 1.1 MG/DL (ref 0.1–1)
BNP SERPL-MCNC: 118 PG/ML (ref 0–99)
BUN SERPL-MCNC: 13 MG/DL (ref 6–20)
CALCIUM SERPL-MCNC: 10 MG/DL (ref 8.7–10.5)
CHLORIDE SERPL-SCNC: 102 MMOL/L (ref 95–110)
CO2 SERPL-SCNC: 26 MMOL/L (ref 23–29)
CREAT SERPL-MCNC: 1.1 MG/DL (ref 0.5–1.4)
DIASTOLIC DYSFUNCTION: NO
EST. GFR  (AFRICAN AMERICAN): >60 ML/MIN/1.73 M^2
EST. GFR  (NON AFRICAN AMERICAN): >60 ML/MIN/1.73 M^2
ESTIMATED AVG GLUCOSE: 174 MG/DL (ref 68–131)
ESTIMATED PA SYSTOLIC PRESSURE: 32.38
GLUCOSE SERPL-MCNC: 250 MG/DL (ref 70–110)
HBA1C MFR BLD HPLC: 7.7 % (ref 4–5.6)
INFLUENZA A, MOLECULAR: NEGATIVE
INFLUENZA B, MOLECULAR: NEGATIVE
POCT GLUCOSE: 192 MG/DL (ref 70–110)
POCT GLUCOSE: 195 MG/DL (ref 70–110)
POCT GLUCOSE: 253 MG/DL (ref 70–110)
POCT GLUCOSE: 258 MG/DL (ref 70–110)
POTASSIUM SERPL-SCNC: 3.8 MMOL/L (ref 3.5–5.1)
PROT SERPL-MCNC: 7.8 G/DL (ref 6–8.4)
RETIRED EF AND QEF - SEE NOTES: 55 (ref 55–65)
SODIUM SERPL-SCNC: 141 MMOL/L (ref 136–145)
SPECIMEN SOURCE: NORMAL
TROPONIN I SERPL DL<=0.01 NG/ML-MCNC: 0.01 NG/ML (ref 0–0.03)

## 2020-02-03 PROCEDURE — 25500020 PHARM REV CODE 255: Mod: ER | Performed by: EMERGENCY MEDICINE

## 2020-02-03 PROCEDURE — 25000003 PHARM REV CODE 250: Performed by: FAMILY MEDICINE

## 2020-02-03 PROCEDURE — G0378 HOSPITAL OBSERVATION PER HR: HCPCS

## 2020-02-03 PROCEDURE — 87040 BLOOD CULTURE FOR BACTERIA: CPT

## 2020-02-03 PROCEDURE — 25000242 PHARM REV CODE 250 ALT 637 W/ HCPCS: Performed by: FAMILY MEDICINE

## 2020-02-03 PROCEDURE — 93307 2D ECHO ONLY: ICD-10-PCS | Mod: 26,,, | Performed by: INTERNAL MEDICINE

## 2020-02-03 PROCEDURE — 27100171 HC OXYGEN HIGH FLOW UP TO 24 HOURS: Mod: ER

## 2020-02-03 PROCEDURE — 36415 COLL VENOUS BLD VENIPUNCTURE: CPT

## 2020-02-03 PROCEDURE — 94640 AIRWAY INHALATION TREATMENT: CPT

## 2020-02-03 PROCEDURE — 27100092 HC HIGH FLOW DELIVERY CANNULA

## 2020-02-03 PROCEDURE — 87502 INFLUENZA DNA AMP PROBE: CPT | Mod: ER

## 2020-02-03 PROCEDURE — 25000003 PHARM REV CODE 250: Mod: ER | Performed by: EMERGENCY MEDICINE

## 2020-02-03 PROCEDURE — 83036 HEMOGLOBIN GLYCOSYLATED A1C: CPT

## 2020-02-03 PROCEDURE — 93307 TTE W/O DOPPLER COMPLETE: CPT

## 2020-02-03 PROCEDURE — 27000221 HC OXYGEN, UP TO 24 HOURS

## 2020-02-03 PROCEDURE — 96372 THER/PROPH/DIAG INJ SC/IM: CPT | Mod: 59 | Performed by: EMERGENCY MEDICINE

## 2020-02-03 PROCEDURE — 63600175 PHARM REV CODE 636 W HCPCS: Mod: ER | Performed by: EMERGENCY MEDICINE

## 2020-02-03 PROCEDURE — 27100092 HC HIGH FLOW DELIVERY CANNULA: Mod: ER

## 2020-02-03 PROCEDURE — 25000003 PHARM REV CODE 250: Performed by: NURSE PRACTITIONER

## 2020-02-03 PROCEDURE — 63600175 PHARM REV CODE 636 W HCPCS: Performed by: FAMILY MEDICINE

## 2020-02-03 PROCEDURE — 93307 TTE W/O DOPPLER COMPLETE: CPT | Mod: 26,,, | Performed by: INTERNAL MEDICINE

## 2020-02-03 RX ORDER — ASPIRIN 81 MG/1
81 TABLET ORAL DAILY
Status: DISCONTINUED | OUTPATIENT
Start: 2020-02-03 | End: 2020-02-04 | Stop reason: HOSPADM

## 2020-02-03 RX ORDER — CLONIDINE HYDROCHLORIDE 0.1 MG/1
0.1 TABLET ORAL
Status: COMPLETED | OUTPATIENT
Start: 2020-02-03 | End: 2020-02-03

## 2020-02-03 RX ORDER — ENOXAPARIN SODIUM 100 MG/ML
40 INJECTION SUBCUTANEOUS EVERY 24 HOURS
Status: DISCONTINUED | OUTPATIENT
Start: 2020-02-03 | End: 2020-02-04 | Stop reason: HOSPADM

## 2020-02-03 RX ORDER — HYDRALAZINE HYDROCHLORIDE 20 MG/ML
10 INJECTION INTRAMUSCULAR; INTRAVENOUS EVERY 4 HOURS PRN
Status: DISCONTINUED | OUTPATIENT
Start: 2020-02-03 | End: 2020-02-04 | Stop reason: HOSPADM

## 2020-02-03 RX ORDER — GLUCAGON 1 MG
1 KIT INJECTION
Status: DISCONTINUED | OUTPATIENT
Start: 2020-02-03 | End: 2020-02-04 | Stop reason: HOSPADM

## 2020-02-03 RX ORDER — BENZONATATE 100 MG/1
200 CAPSULE ORAL
Status: COMPLETED | OUTPATIENT
Start: 2020-02-03 | End: 2020-02-03

## 2020-02-03 RX ORDER — ATORVASTATIN CALCIUM 40 MG/1
40 TABLET, FILM COATED ORAL DAILY
Status: DISCONTINUED | OUTPATIENT
Start: 2020-02-03 | End: 2020-02-04 | Stop reason: HOSPADM

## 2020-02-03 RX ORDER — AMLODIPINE BESYLATE 10 MG/1
10 TABLET ORAL DAILY
Status: DISCONTINUED | OUTPATIENT
Start: 2020-02-03 | End: 2020-02-04 | Stop reason: HOSPADM

## 2020-02-03 RX ORDER — INSULIN ASPART 100 [IU]/ML
1-10 INJECTION, SOLUTION INTRAVENOUS; SUBCUTANEOUS
Status: DISCONTINUED | OUTPATIENT
Start: 2020-02-03 | End: 2020-02-04 | Stop reason: HOSPADM

## 2020-02-03 RX ORDER — IPRATROPIUM BROMIDE AND ALBUTEROL SULFATE 2.5; .5 MG/3ML; MG/3ML
3 SOLUTION RESPIRATORY (INHALATION)
Status: DISCONTINUED | OUTPATIENT
Start: 2020-02-03 | End: 2020-02-04 | Stop reason: HOSPADM

## 2020-02-03 RX ORDER — PREDNISONE 20 MG/1
20 TABLET ORAL DAILY
Status: DISCONTINUED | OUTPATIENT
Start: 2020-02-03 | End: 2020-02-04 | Stop reason: HOSPADM

## 2020-02-03 RX ORDER — PROMETHAZINE HYDROCHLORIDE AND CODEINE PHOSPHATE 6.25; 1 MG/5ML; MG/5ML
5 SOLUTION ORAL EVERY 4 HOURS PRN
Status: DISCONTINUED | OUTPATIENT
Start: 2020-02-03 | End: 2020-02-04 | Stop reason: HOSPADM

## 2020-02-03 RX ORDER — MORPHINE SULFATE 4 MG/ML
2 INJECTION, SOLUTION INTRAMUSCULAR; INTRAVENOUS
Status: COMPLETED | OUTPATIENT
Start: 2020-02-03 | End: 2020-02-03

## 2020-02-03 RX ORDER — CANDESARTAN 4 MG/1
8 TABLET ORAL DAILY
Status: DISCONTINUED | OUTPATIENT
Start: 2020-02-03 | End: 2020-02-04 | Stop reason: HOSPADM

## 2020-02-03 RX ORDER — AZITHROMYCIN 250 MG/1
250 TABLET, FILM COATED ORAL DAILY
Status: DISCONTINUED | OUTPATIENT
Start: 2020-02-04 | End: 2020-02-04 | Stop reason: HOSPADM

## 2020-02-03 RX ORDER — IBUPROFEN 200 MG
16 TABLET ORAL
Status: DISCONTINUED | OUTPATIENT
Start: 2020-02-03 | End: 2020-02-04 | Stop reason: HOSPADM

## 2020-02-03 RX ORDER — ESCITALOPRAM OXALATE 10 MG/1
20 TABLET ORAL DAILY
Status: DISCONTINUED | OUTPATIENT
Start: 2020-02-03 | End: 2020-02-04 | Stop reason: HOSPADM

## 2020-02-03 RX ORDER — IBUPROFEN 200 MG
24 TABLET ORAL
Status: DISCONTINUED | OUTPATIENT
Start: 2020-02-03 | End: 2020-02-04 | Stop reason: HOSPADM

## 2020-02-03 RX ADMIN — AZITHROMYCIN MONOHYDRATE 500 MG: 500 INJECTION, POWDER, LYOPHILIZED, FOR SOLUTION INTRAVENOUS at 04:02

## 2020-02-03 RX ADMIN — PREDNISONE 20 MG: 20 TABLET ORAL at 10:02

## 2020-02-03 RX ADMIN — IOHEXOL 75 ML: 350 INJECTION, SOLUTION INTRAVENOUS at 01:02

## 2020-02-03 RX ADMIN — ESCITALOPRAM OXALATE 20 MG: 10 TABLET ORAL at 10:02

## 2020-02-03 RX ADMIN — SODIUM CHLORIDE 1000 ML: 0.9 INJECTION, SOLUTION INTRAVENOUS at 03:02

## 2020-02-03 RX ADMIN — IPRATROPIUM BROMIDE AND ALBUTEROL SULFATE 3 ML: .5; 3 SOLUTION RESPIRATORY (INHALATION) at 08:02

## 2020-02-03 RX ADMIN — INSULIN ASPART 3 UNITS: 100 INJECTION, SOLUTION INTRAVENOUS; SUBCUTANEOUS at 08:02

## 2020-02-03 RX ADMIN — AMLODIPINE BESYLATE 10 MG: 10 TABLET ORAL at 03:02

## 2020-02-03 RX ADMIN — PROMETHAZINE HYDROCHLORIDE AND CODEINE PHOSPHATE 5 ML: 6.25; 1 SOLUTION ORAL at 09:02

## 2020-02-03 RX ADMIN — ENOXAPARIN SODIUM 40 MG: 100 INJECTION SUBCUTANEOUS at 04:02

## 2020-02-03 RX ADMIN — MORPHINE SULFATE 2 MG: 4 INJECTION INTRAVENOUS at 02:02

## 2020-02-03 RX ADMIN — IPRATROPIUM BROMIDE AND ALBUTEROL SULFATE 3 ML: .5; 3 SOLUTION RESPIRATORY (INHALATION) at 07:02

## 2020-02-03 RX ADMIN — INSULIN ASPART 2 UNITS: 100 INJECTION, SOLUTION INTRAVENOUS; SUBCUTANEOUS at 11:02

## 2020-02-03 RX ADMIN — CLONIDINE HYDROCHLORIDE 0.1 MG: 0.1 TABLET ORAL at 03:02

## 2020-02-03 RX ADMIN — INSULIN ASPART 6 UNITS: 100 INJECTION, SOLUTION INTRAVENOUS; SUBCUTANEOUS at 04:02

## 2020-02-03 RX ADMIN — ASPIRIN 81 MG: 81 TABLET ORAL at 10:02

## 2020-02-03 RX ADMIN — CANDESARTAN 8 MG: 4 TABLET ORAL at 03:02

## 2020-02-03 RX ADMIN — ATORVASTATIN CALCIUM 40 MG: 40 TABLET, FILM COATED ORAL at 10:02

## 2020-02-03 RX ADMIN — CEFTRIAXONE 2 G: 2 INJECTION, SOLUTION INTRAVENOUS at 03:02

## 2020-02-03 RX ADMIN — PROMETHAZINE HYDROCHLORIDE AND CODEINE PHOSPHATE 5 ML: 6.25; 1 SOLUTION ORAL at 11:02

## 2020-02-03 RX ADMIN — BENZONATATE 200 MG: 100 CAPSULE ORAL at 02:02

## 2020-02-03 NOTE — ASSESSMENT & PLAN NOTE
2/3:   Rocephin and azithromycin  duonebs   Prednisone   O2 prn   The patient continue to have increased WOB and requiring supplemental O2. Currently weaned to 3LNC. Continue currentl management with ABX and Neb tx.

## 2020-02-03 NOTE — NURSING
Received from [facility] ED, via EMS. Patient settled into the room, vital signs obtained, will notify physician of his arrival for orders. No respiratory distress or pain noted at this time.

## 2020-02-03 NOTE — H&P
[facility] Medicine  History & Physical    Patient Name: Rafael Maharaj  MRN: [id number]  Admission Date: 2/2/2020  Attending Physician: Cedrick Lopez MD  Primary Care Provider: Leon Mitchell MD         Patient information was obtained from patient, spouse/SO and ER records.     Subjective:     Principal Problem:Community acquired pneumonia    Chief Complaint:   Chief Complaint   Patient presents with    Cough     + chest congestion for 2 days, states it has gotten worse tonight         HPI: Patient is a 59-year-old male with a PMH of HTN and DM who presents as transfer from [facility] with complaints of cough and dyspnea. He states that he started with a cough and chest congestion 1 week ago and symptoms has been getting worse ever since. 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 He reports dyspnea for the past 2 days. Reports a T-max of 99.1.  Denies chest pain, pedal edema, leg pain, nausea/vomiting.  Cough is productive.  No prior physician evaluation/treatment. Denies any history of smoking.     While at [facility], chest xray was concerning for vascular congestion. Per report Ct chest showed infiltrates and small < 10% pneumothorax. Awaiting final CT read. He was started on rocephin and azithromycin and transferred here. Patient is full code. Surrogate decision maker is wife.     Past Medical History:   Diagnosis Date    Diabetes mellitus     High cholesterol     Hypertension     Insomnia        Past Surgical History:   Procedure Laterality Date    CERVICAL DISCECTOMY      KNEE ARTHROSCOPY      RHINOPLASTY         Review of patient's allergies indicates:   Allergen Reactions    Sulfa (sulfonamide antibiotics) Itching       No current facility-administered medications on file prior to encounter.      Current Outpatient Medications on File Prior to Encounter   Medication Sig     amlodipine-olmesartan (IAN) 10-40 mg per tablet Take 1 tablet by mouth once daily.    aspirin (ECOTRIN) 81 MG EC tablet Take 81 mg by mouth once daily.    atorvastatin (LIPITOR) 40 MG tablet Take 40 mg by mouth once daily.    clindamycin (CLEOCIN) 300 MG capsule Take 1 capsule (300 mg total) by mouth every 8 (eight) hours.    escitalopram oxalate (LEXAPRO) 20 MG tablet Take 1 tablet by mouth once daily.    insulin glargine (LANTUS SOLOSTAR) 100 unit/mL (3 mL) InPn pen Inject into the skin every evening. Start with 20 units at bedtime then add 1 unit every day until blood sugar is less than 140    metFORMIN (GLUCOPHAGE-XR) 750 MG 24 hr tablet Take 750 mg by mouth 2 (two) times daily.    nebivolol (BYSTOLIC) 20 mg Tab Take by mouth every evening.    pioglitazone-metformin (ACTOPLUS MET)  mg per tablet Take 1 tablet by mouth 2 (two) times daily.    triazolam (HALCION) 0.25 MG Tab Take 0.125 mg by mouth nightly as needed.     Family History     None        Tobacco Use    Smoking status: Never Smoker    Smokeless tobacco: Never Used   Substance and Sexual Activity    Alcohol use: Yes    Drug use: No    Sexual activity: Not on file     Review of Systems   Constitutional: Negative for fatigue and fever.   HENT: Negative for congestion.    Respiratory: Positive for cough and shortness of breath. Negative for chest tightness and wheezing.    Cardiovascular: Negative for chest pain, palpitations and leg swelling.   Gastrointestinal: Negative for nausea and vomiting.   Skin: Negative for rash.   Neurological: Negative for dizziness.     Objective:     Vital Signs (Most Recent):  Temp: 98.6 °F (37 °C) (02/03/20 0600)  Pulse: 63 (02/03/20 0600)  Resp: 18 (02/03/20 0600)  BP: (!) 171/87 (02/03/20 0600)  SpO2: 97 % (02/03/20 0600) Vital Signs (24h Range):  Temp:  [98.6 °F (37 °C)-99.5 °F (37.5 °C)] 98.6 °F (37 °C)  Pulse:  [63-82] 63  Resp:  [13-20] 18  SpO2:  [96 %-100 %] 97 %  BP: (164-188)/() 171/87      Weight: 101.8 kg (224 lb 6.9 oz)  Body mass index is 36.22 kg/m².    Physical Exam   Constitutional: He is oriented to person, place, and time. He appears well-developed and well-nourished. No distress.   HENT:   Head: Normocephalic and atraumatic.   Eyes: Pupils are equal, round, and reactive to light. Conjunctivae are normal.   Cardiovascular: Normal rate, regular rhythm and normal heart sounds. Exam reveals no gallop and no friction rub.   No murmur heard.  Pulmonary/Chest: Effort normal. No stridor. No respiratory distress. He has no wheezes. He has rales. He exhibits no tenderness.   Abdominal: Soft. Bowel sounds are normal. He exhibits no distension and no mass. There is no tenderness. There is no guarding.   Musculoskeletal: Normal range of motion. He exhibits edema (1+ BLE).   Neurological: He is alert and oriented to person, place, and time.   Skin: Skin is warm. He is not diaphoretic. No erythema.         CRANIAL NERVES     CN III, IV, VI   Pupils are equal, round, and reactive to light.       Significant Labs:   Results for orders placed or performed during the hospital encounter of 02/02/20   Influenza A & B by Molecular   Result Value Ref Range    Influenza A, Molecular Negative Negative    Influenza B, Molecular Negative Negative    Flu A & B Source NP    CBC auto differential   Result Value Ref Range    WBC 8.32 3.90 - 12.70 K/uL    RBC 4.51 (L) 4.60 - 6.20 M/uL    Hemoglobin 12.5 (L) 14.0 - 18.0 g/dL    Hematocrit 39.0 (L) 40.0 - 54.0 %    Mean Corpuscular Volume 87 82 - 98 fL    Mean Corpuscular Hemoglobin 27.7 27.0 - 31.0 pg    Mean Corpuscular Hemoglobin Conc 32.1 32.0 - 36.0 g/dL    RDW 13.6 11.5 - 14.5 %    Platelets 265 150 - 350 K/uL    MPV 10.1 9.2 - 12.9 fL    Immature Granulocytes 0.5 0.0 - 0.5 %    Gran # (ANC) 6.5 1.8 - 7.7 K/uL    Immature Grans (Abs) 0.04 0.00 - 0.04 K/uL    Lymph # 0.8 (L) 1.0 - 4.8 K/uL    Mono # 0.8 0.3 - 1.0 K/uL    Eos # 0.1 0.0 - 0.5 K/uL    Baso # 0.03 0.00 -  0.20 K/uL    nRBC 0 0 /100 WBC    Gran% 77.9 (H) 38.0 - 73.0 %    Lymph% 9.6 (L) 18.0 - 48.0 %    Mono% 9.9 4.0 - 15.0 %    Eosinophil% 1.7 0.0 - 8.0 %    Basophil% 0.4 0.0 - 1.9 %    Differential Method Automated    Brain natriuretic peptide   Result Value Ref Range     (H) 0 - 99 pg/mL   Comprehensive metabolic panel   Result Value Ref Range    Sodium 141 136 - 145 mmol/L    Potassium 3.8 3.5 - 5.1 mmol/L    Chloride 102 95 - 110 mmol/L    CO2 26 23 - 29 mmol/L    Glucose 250 (H) 70 - 110 mg/dL    BUN, Bld 13 6 - 20 mg/dL    Creatinine 1.1 0.5 - 1.4 mg/dL    Calcium 10.0 8.7 - 10.5 mg/dL    Total Protein 7.8 6.0 - 8.4 g/dL    Albumin 3.8 3.5 - 5.2 g/dL    Total Bilirubin 1.1 (H) 0.1 - 1.0 mg/dL    Alkaline Phosphatase 95 55 - 135 U/L    AST 14 10 - 40 U/L    ALT 16 10 - 44 U/L    Anion Gap 13 8 - 16 mmol/L    eGFR if African American >60.0 >60 mL/min/1.73 m^2    eGFR if non African American >60.0 >60 mL/min/1.73 m^2   Troponin I   Result Value Ref Range    Troponin I 0.009 0.000 - 0.026 ng/mL   POCT glucose   Result Value Ref Range    POCT Glucose 195 (H) 70 - 110 mg/dL        Significant Imaging: I have reviewed all pertinent imaging results/findings within the past 24 hours.    Assessment/Plan:     * Community acquired pneumonia  2/3:   Rocephin and azithromycin  duonebs   Prednisone   O2 prn         Type 2 diabetes mellitus, with long-term current use of insulin  2/3:  Sliding scale  Ada diet       Essential hypertension  2/3:  Resumed home norvasc  Hydralazine PRN       Pneumothorax  2/3:  Appears to be small  No respiratory distress currently  Will need to monitor for worsening  Repeat imaging in am if needed         VTE Risk Mitigation (From admission, onward)         Ordered     enoxaparin injection 40 mg  Daily      02/03/20 0608                   Cedrick Lopez MD  Department of Hospital Medicine   [facility]

## 2020-02-03 NOTE — HPI
Patient is a 59-year-old male with a PMH of HTN and DM who presents as transfer from [facility] with complaints of cough and dyspnea. He states that he started with a cough and chest congestion 1 week ago and symptoms has been getting worse ever since. 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 He reports dyspnea for the past 2 days. Reports a T-max of 99.1.  Denies chest pain, pedal edema, leg pain, nausea/vomiting.  Cough is productive.  No prior physician evaluation/treatment. Denies any history of smoking.     While at [facility], chest xray was concerning for vascular congestion. Per report Ct chest showed infiltrates and small < 10% pneumothorax. Awaiting final CT read. He was started on rocephin and azithromycin and transferred here. Patient is full code. Surrogate decision maker is wife.

## 2020-02-03 NOTE — HOSPITAL COURSE
2/3/20 The patient continue to have increased WOB and requiring supplemental O2. Currently weaned to 3LNC. Continue currentl management with ABX and Neb tx. 2/4/20 No acute issues overnight. The patient reports improvement in symptoms overnight. No resp distress noted on exam. The patient was seen and examined today and deemed stable for discharge. The patient will complete a course of levaquin and will follow up with his PCP.

## 2020-02-03 NOTE — SUBJECTIVE & OBJECTIVE
Past Medical History:   Diagnosis Date    Diabetes mellitus     High cholesterol     Hypertension     Insomnia        Past Surgical History:   Procedure Laterality Date    CERVICAL DISCECTOMY      KNEE ARTHROSCOPY      RHINOPLASTY         Review of patient's allergies indicates:   Allergen Reactions    Sulfa (sulfonamide antibiotics) Itching       No current facility-administered medications on file prior to encounter.      Current Outpatient Medications on File Prior to Encounter   Medication Sig    amlodipine-olmesartan (IAN) 10-40 mg per tablet Take 1 tablet by mouth once daily.    aspirin (ECOTRIN) 81 MG EC tablet Take 81 mg by mouth once daily.    atorvastatin (LIPITOR) 40 MG tablet Take 40 mg by mouth once daily.    clindamycin (CLEOCIN) 300 MG capsule Take 1 capsule (300 mg total) by mouth every 8 (eight) hours.    escitalopram oxalate (LEXAPRO) 20 MG tablet Take 1 tablet by mouth once daily.    insulin glargine (LANTUS SOLOSTAR) 100 unit/mL (3 mL) InPn pen Inject into the skin every evening. Start with 20 units at bedtime then add 1 unit every day until blood sugar is less than 140    metFORMIN (GLUCOPHAGE-XR) 750 MG 24 hr tablet Take 750 mg by mouth 2 (two) times daily.    nebivolol (BYSTOLIC) 20 mg Tab Take by mouth every evening.    pioglitazone-metformin (ACTOPLUS MET)  mg per tablet Take 1 tablet by mouth 2 (two) times daily.    triazolam (HALCION) 0.25 MG Tab Take 0.125 mg by mouth nightly as needed.     Family History     None        Tobacco Use    Smoking status: Never Smoker    Smokeless tobacco: Never Used   Substance and Sexual Activity    Alcohol use: Yes    Drug use: No    Sexual activity: Not on file     Review of Systems   Constitutional: Negative for fatigue and fever.   HENT: Negative for congestion.    Respiratory: Positive for cough and shortness of breath. Negative for chest tightness and wheezing.    Cardiovascular: Negative for chest pain, palpitations and  leg swelling.   Gastrointestinal: Negative for nausea and vomiting.   Skin: Negative for rash.   Neurological: Negative for dizziness.     Objective:     Vital Signs (Most Recent):  Temp: 98.6 °F (37 °C) (02/03/20 0600)  Pulse: 63 (02/03/20 0600)  Resp: 18 (02/03/20 0600)  BP: (!) 171/87 (02/03/20 0600)  SpO2: 97 % (02/03/20 0600) Vital Signs (24h Range):  Temp:  [98.6 °F (37 °C)-99.5 °F (37.5 °C)] 98.6 °F (37 °C)  Pulse:  [63-82] 63  Resp:  [13-20] 18  SpO2:  [96 %-100 %] 97 %  BP: (164-188)/() 171/87     Weight: 101.8 kg (224 lb 6.9 oz)  Body mass index is 36.22 kg/m².    Physical Exam   Constitutional: He is oriented to person, place, and time. He appears well-developed and well-nourished. No distress.   HENT:   Head: Normocephalic and atraumatic.   Eyes: Pupils are equal, round, and reactive to light. Conjunctivae are normal.   Cardiovascular: Normal rate, regular rhythm and normal heart sounds. Exam reveals no gallop and no friction rub.   No murmur heard.  Pulmonary/Chest: Effort normal. No stridor. No respiratory distress. He has no wheezes. He has rales. He exhibits no tenderness.   Abdominal: Soft. Bowel sounds are normal. He exhibits no distension and no mass. There is no tenderness. There is no guarding.   Musculoskeletal: Normal range of motion. He exhibits edema (1+ BLE).   Neurological: He is alert and oriented to person, place, and time.   Skin: Skin is warm. He is not diaphoretic. No erythema.         CRANIAL NERVES     CN III, IV, VI   Pupils are equal, round, and reactive to light.       Significant Labs:   Results for orders placed or performed during the hospital encounter of 02/02/20   Influenza A & B by Molecular   Result Value Ref Range    Influenza A, Molecular Negative Negative    Influenza B, Molecular Negative Negative    Flu A & B Source NP    CBC auto differential   Result Value Ref Range    WBC 8.32 3.90 - 12.70 K/uL    RBC 4.51 (L) 4.60 - 6.20 M/uL    Hemoglobin 12.5 (L) 14.0 -  18.0 g/dL    Hematocrit 39.0 (L) 40.0 - 54.0 %    Mean Corpuscular Volume 87 82 - 98 fL    Mean Corpuscular Hemoglobin 27.7 27.0 - 31.0 pg    Mean Corpuscular Hemoglobin Conc 32.1 32.0 - 36.0 g/dL    RDW 13.6 11.5 - 14.5 %    Platelets 265 150 - 350 K/uL    MPV 10.1 9.2 - 12.9 fL    Immature Granulocytes 0.5 0.0 - 0.5 %    Gran # (ANC) 6.5 1.8 - 7.7 K/uL    Immature Grans (Abs) 0.04 0.00 - 0.04 K/uL    Lymph # 0.8 (L) 1.0 - 4.8 K/uL    Mono # 0.8 0.3 - 1.0 K/uL    Eos # 0.1 0.0 - 0.5 K/uL    Baso # 0.03 0.00 - 0.20 K/uL    nRBC 0 0 /100 WBC    Gran% 77.9 (H) 38.0 - 73.0 %    Lymph% 9.6 (L) 18.0 - 48.0 %    Mono% 9.9 4.0 - 15.0 %    Eosinophil% 1.7 0.0 - 8.0 %    Basophil% 0.4 0.0 - 1.9 %    Differential Method Automated    Brain natriuretic peptide   Result Value Ref Range     (H) 0 - 99 pg/mL   Comprehensive metabolic panel   Result Value Ref Range    Sodium 141 136 - 145 mmol/L    Potassium 3.8 3.5 - 5.1 mmol/L    Chloride 102 95 - 110 mmol/L    CO2 26 23 - 29 mmol/L    Glucose 250 (H) 70 - 110 mg/dL    BUN, Bld 13 6 - 20 mg/dL    Creatinine 1.1 0.5 - 1.4 mg/dL    Calcium 10.0 8.7 - 10.5 mg/dL    Total Protein 7.8 6.0 - 8.4 g/dL    Albumin 3.8 3.5 - 5.2 g/dL    Total Bilirubin 1.1 (H) 0.1 - 1.0 mg/dL    Alkaline Phosphatase 95 55 - 135 U/L    AST 14 10 - 40 U/L    ALT 16 10 - 44 U/L    Anion Gap 13 8 - 16 mmol/L    eGFR if African American >60.0 >60 mL/min/1.73 m^2    eGFR if non African American >60.0 >60 mL/min/1.73 m^2   Troponin I   Result Value Ref Range    Troponin I 0.009 0.000 - 0.026 ng/mL   POCT glucose   Result Value Ref Range    POCT Glucose 195 (H) 70 - 110 mg/dL        Significant Imaging: I have reviewed all pertinent imaging results/findings within the past 24 hours.

## 2020-02-03 NOTE — SUBJECTIVE & OBJECTIVE
Interval History: The patient continue to have increased WOB and requiring supplemental O2. Currently weaned to 3LNC. Continue currentl management with ABX and Neb tx.       Review of Systems   Constitutional: Negative for activity change, appetite change, chills, fatigue, fever and unexpected weight change.   HENT: Negative for congestion, facial swelling, rhinorrhea, sinus pressure, sneezing and sore throat.    Eyes: Negative for discharge, redness and visual disturbance.   Respiratory: Positive for cough and shortness of breath. Negative for apnea, chest tightness, wheezing and stridor.    Cardiovascular: Negative for chest pain, palpitations and leg swelling.   Gastrointestinal: Negative for abdominal distention, abdominal pain, anal bleeding, blood in stool, constipation, diarrhea, nausea and vomiting.   Genitourinary: Negative for difficulty urinating, discharge, dysuria, frequency and hematuria.   Musculoskeletal: Negative for arthralgias, back pain, gait problem, joint swelling, myalgias, neck pain and neck stiffness.   Skin: Negative for color change, pallor and rash.   Neurological: Negative for dizziness, tremors, seizures, syncope, facial asymmetry, speech difficulty, weakness, light-headedness, numbness and headaches.   Psychiatric/Behavioral: Negative for behavioral problems, confusion, hallucinations and suicidal ideas. The patient is not nervous/anxious.    All other systems reviewed and are negative.    Objective:     Vital Signs (Most Recent):  Temp: 97.6 °F (36.4 °C) (02/03/20 1615)  Pulse: 72 (02/03/20 1615)  Resp: 18 (02/03/20 1615)  BP: (!) 170/91 (02/03/20 1615)  SpO2: 95 % (02/03/20 1615) Vital Signs (24h Range):  Temp:  [97.6 °F (36.4 °C)-99.5 °F (37.5 °C)] 97.6 °F (36.4 °C)  Pulse:  [58-82] 72  Resp:  [13-20] 18  SpO2:  [94 %-100 %] 95 %  BP: (131-188)/() 170/91     Weight: 101.8 kg (224 lb 6.9 oz)  Body mass index is 36.22 kg/m².    Intake/Output Summary (Last 24 hours) at 2/3/2020  1718  Last data filed at 2/3/2020 1231  Gross per 24 hour   Intake 600 ml   Output --   Net 600 ml      Physical Exam   Constitutional: He is oriented to person, place, and time. He appears well-developed and well-nourished. No distress.   HENT:   Head: Normocephalic and atraumatic.   Eyes: Pupils are equal, round, and reactive to light. Conjunctivae are normal.   Neck: Normal range of motion. Neck supple.   Cardiovascular: Normal rate, regular rhythm, normal heart sounds and intact distal pulses. Exam reveals no gallop and no friction rub.   No murmur heard.  Pulmonary/Chest: Effort normal. No stridor. No respiratory distress. He has no wheezes. He has rales. He exhibits no tenderness.   Abdominal: Soft. Bowel sounds are normal. He exhibits no distension and no mass. There is no tenderness. There is no guarding.   Musculoskeletal: Normal range of motion. He exhibits edema (1+ BLE). He exhibits no tenderness or deformity.   Neurological: He is alert and oriented to person, place, and time.   Skin: Skin is warm and dry. No rash noted. He is not diaphoretic. No erythema.   Psychiatric: He has a normal mood and affect. His behavior is normal.   Nursing note and vitals reviewed.      Significant Labs: All pertinent labs within the past 24 hours have been reviewed.    Significant Imaging:   Imaging Results          CT Chest With Contrast (Final result)  Result time 02/03/20 08:12:36    Final result by Usama Florian MD (02/03/20 08:12:36)                 Impression:      Patchy nodular infiltrate within the left lower lobe could reflect infectious or inflammatory pneumonitis.    Small left-sided pneumothorax without mass effect.    Mild aneurysmal dilatation of the ascending aorta measuring 4.3 cm.    For multiple solid nodules with any 6 mm or greater, Fleischner Society guidelines recommend follow up with non-contrast chest CT at 3-6 months and 18-24 months after discovery.    All CT scans at this facility use dose  modulation, iterative reconstruction and/or weight based dosing when appropriate to reduce radiation dose to as low as reasonably achievable.      Electronically signed by: Usama Florian MD  Date:    02/03/2020  Time:    08:12             Narrative:    EXAMINATION:  CT CHEST WITH CONTRAST    CLINICAL HISTORY:  Shortness of breath;    TECHNIQUE:  The chest was surveyed from the apices through the posterior costophrenic angles after administration of 75 cc of Omni 350 contrast.  Data was reconstructed for multiplanar images in axial, sagittal and coronal planes in for maximal intensity projection images in the axial plane.    COMPARISON:  Chest x-ray 02/2/2020    FINDINGS:  Base of Neck: No significant abnormality.    Airways: Patent.    Lungs: Patchy nodular infiltrate within the left lower lobe could reflect infectious or inflammatory pneumonitis.  3 mm nodule left upper lobe 6 mm nodule lingula image 33 sequence 601 3 mm nodule lingula.  Mild dependent changes seen bilaterally.  Six mm nodule right lower lobe image 48 sequence 601.  4 mm nodule right lower lobe.  4 mm nodule right upper lobe.  For multiple solid nodules with any 6 mm or greater, Fleischner Society guidelines recommend follow up with non-contrast chest CT at 3-6 months and 18-24 months after discovery.    Pleura: Small left-sided pneumothorax.  No pleural fluid.No pleural calcification.    Ema/Mediastinum: Shotty sanju enlargement.    Esophagus: Normal.    Heart/pericardium: Mild cardiomegaly.  No pericardial effusion or calcification.    Pulmonary vasculature: Pulmonary arteries distribute normally.  No evidence of pulmonary embolism.  No evidence of pulmonary hypertension.  There are four pulmonary veins.    Aorta: Left-sided aortic arch with 3 arterial branches.  Ascending aorta measures 4.3 cm.  There is mild calcification of the thoracic aorta.  Tortuosity of the descending aorta can be seen with chronic hypertension.   There is  moderate  coronary artery calcification.    Thoracic soft tissues: Normal. Both breasts are present.    Bones: No acute fracture.  Mild degenerative changes.  No suspicious lytic or sclerotic lesion.    Upper Abdomen: Mild constipation.  No acute abnormality of the partially imaged upper abdomen.                               X-Ray Chest AP Portable (Final result)  Result time 02/03/20 00:06:13    Final result by Blue Gamino MD (02/03/20 00:06:13)                 Impression:      1.  Mild basilar interstitial changes.  Cardiac silhouette size enlargement.  A function of mild interstitial pulmonary edema or interstitial infectious process difficult to exclude in the right clinical setting.    2.  Stable findings as noted above.      Electronically signed by: Blue Gamino MD  Date:    02/03/2020  Time:    00:06             Narrative:    EXAMINATION:  XR CHEST AP PORTABLE    CLINICAL HISTORY:  fever;    COMPARISON:  Studies dating back to February 2, 2018    FINDINGS:  There are mild basilar interstitial changes.  The lungs are free of alveolar opacities.  The cardiac silhouette size is enlarged.  The trachea is midline and the mediastinal width is normal. Negative for focal infiltrate, effusion or pneumothorax. Pulmonary vasculature is mildly congested.  Negative for osseous abnormalities. Tortuous aorta with calcifications of the aortic knob.  Degenerative changes of the spine and both shoulder girdles with convex-left curvature.

## 2020-02-03 NOTE — PLAN OF CARE
SW met with patient at bedside to assess for discharge planning.  Patient was alert and oriented.  Patient denied the use of any respiratory/medical assistive equipment or home health services before coming to the hospital.  Patient identified his wife as his help at home, and stated that he manages his own healthcare.  Patient denied the need for any assistive equipment, home health services, and all other community resources at this time.  Patient anticipates discharging with no needs.  SW provided a transitional care folder, information on advanced directives, information on pharmacy bedside delivery, and discharge planning begins on admission with contact information for any needs/questions.      D/C Plan:  Home  PCP:  Dr. Patrick Mitchell  Preferred Pharmacy:  [facility] ([place])  Discharge transportation:  Spouse  [facility]:    Pharmacy Bedside Delivery:  Yes       02/03/20 6068   Discharge Assessment   Assessment Type Discharge Planning Assessment   Confirmed/corrected address and phone number on facesheet? Yes   Expected Length of Stay (days)   (TBD)   Communicated expected length of stay with patient/caregiver yes   Prior to hospitilization cognitive status: Alert/Oriented   Prior to hospitalization functional status: Independent   Current cognitive status: Alert/Oriented   Current Functional Status: Independent   Facility Arrived From: [facility]   Lives With spouse   Able to Return to Prior Arrangements yes   Is patient able to care for self after discharge? Yes   Who are your caregiver(s) and their phone number(s)? Horacio Maharaj (spouse) [phone]   Readmission Within the Last 30 Days no previous admission in last 30 days   Patient currently being followed by outpatient case management? No   Patient currently receives any other outside agency services? No   Equipment Currently Used at Home none   Do you have any problems affording any of your prescribed medications? No   Is the patient taking  medications as prescribed? yes   Does the patient have transportation home? Yes   Transportation Anticipated family or friend will provide   Does the patient receive services at the [facility]? No   Discharge Plan A Home   DME Needed Upon Discharge  none   Patient/Family in Agreement with Plan yes

## 2020-02-03 NOTE — PLAN OF CARE
Pt AAO x 4. Ambulatory. No c/o pain. Oxygen @ 2L via NC. Free from injury. VSS. Glucose control. NADN. Call light in reach. chart check completed.

## 2020-02-03 NOTE — ED NOTES
Patient in bed resting at this time, c monitor recording. Pt and family member updated on poc at this time .verbalized understanding. Denies chest pain or sob at this time, + cough + chest congestion, skin warm dry , AAOx3, complains of right sided rib pain when coughing or deep breathing.

## 2020-02-03 NOTE — PROGRESS NOTES
[facility] Hospital Medicine  Progress Note    Patient Name: Rafael Maharaj  MRN: [id number]  Patient Class: OP- Observation   Admission Date: 2/2/2020  Length of Stay: 0 days  Attending Physician: Lenny Lynn MD  Primary Care Provider: Leon Mitchell MD        Subjective:     Principal Problem:Community acquired pneumonia        HPI:  Patient is a 59-year-old male with a PMH of HTN and DM who presents as transfer from [facility] with complaints of cough and dyspnea. He states that he started with a cough and chest congestion 1 week ago and symptoms has been getting worse ever since. 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 He reports dyspnea for the past 2 days. Reports a T-max of 99.1.  Denies chest pain, pedal edema, leg pain, nausea/vomiting.  Cough is productive.  No prior physician evaluation/treatment. Denies any history of smoking.     While at [facility], chest xray was concerning for vascular congestion. Per report Ct chest showed infiltrates and small < 10% pneumothorax. Awaiting final CT read. He was started on rocephin and azithromycin and transferred here. Patient is full code. Surrogate decision maker is wife.     Overview/Hospital Course:  2/3/20 The patient continue to have increased WOB and requiring supplemental O2. Currently weaned to 3LNC. Continue currentl management with ABX and Neb tx.     Interval History: The patient continue to have increased WOB and requiring supplemental O2. Currently weaned to 3LNC. Continue currentl management with ABX and Neb tx.       Review of Systems   Constitutional: Negative for activity change, appetite change, chills, fatigue, fever and unexpected weight change.   HENT: Negative for congestion, facial swelling, rhinorrhea, sinus pressure, sneezing and sore throat.    Eyes: Negative for discharge, redness and visual disturbance.   Respiratory:  Positive for cough and shortness of breath. Negative for apnea, chest tightness, wheezing and stridor.    Cardiovascular: Negative for chest pain, palpitations and leg swelling.   Gastrointestinal: Negative for abdominal distention, abdominal pain, anal bleeding, blood in stool, constipation, diarrhea, nausea and vomiting.   Genitourinary: Negative for difficulty urinating, discharge, dysuria, frequency and hematuria.   Musculoskeletal: Negative for arthralgias, back pain, gait problem, joint swelling, myalgias, neck pain and neck stiffness.   Skin: Negative for color change, pallor and rash.   Neurological: Negative for dizziness, tremors, seizures, syncope, facial asymmetry, speech difficulty, weakness, light-headedness, numbness and headaches.   Psychiatric/Behavioral: Negative for behavioral problems, confusion, hallucinations and suicidal ideas. The patient is not nervous/anxious.    All other systems reviewed and are negative.    Objective:     Vital Signs (Most Recent):  Temp: 97.6 °F (36.4 °C) (02/03/20 1615)  Pulse: 72 (02/03/20 1615)  Resp: 18 (02/03/20 1615)  BP: (!) 170/91 (02/03/20 1615)  SpO2: 95 % (02/03/20 1615) Vital Signs (24h Range):  Temp:  [97.6 °F (36.4 °C)-99.5 °F (37.5 °C)] 97.6 °F (36.4 °C)  Pulse:  [58-82] 72  Resp:  [13-20] 18  SpO2:  [94 %-100 %] 95 %  BP: (131-188)/() 170/91     Weight: 101.8 kg (224 lb 6.9 oz)  Body mass index is 36.22 kg/m².    Intake/Output Summary (Last 24 hours) at 2/3/2020 1718  Last data filed at 2/3/2020 1231  Gross per 24 hour   Intake 600 ml   Output --   Net 600 ml      Physical Exam   Constitutional: He is oriented to person, place, and time. He appears well-developed and well-nourished. No distress.   HENT:   Head: Normocephalic and atraumatic.   Eyes: Pupils are equal, round, and reactive to light. Conjunctivae are normal.   Neck: Normal range of motion. Neck supple.   Cardiovascular: Normal rate, regular rhythm, normal heart sounds and intact distal  pulses. Exam reveals no gallop and no friction rub.   No murmur heard.  Pulmonary/Chest: Effort normal. No stridor. No respiratory distress. He has no wheezes. He has rales. He exhibits no tenderness.   Abdominal: Soft. Bowel sounds are normal. He exhibits no distension and no mass. There is no tenderness. There is no guarding.   Musculoskeletal: Normal range of motion. He exhibits edema (1+ BLE). He exhibits no tenderness or deformity.   Neurological: He is alert and oriented to person, place, and time.   Skin: Skin is warm and dry. No rash noted. He is not diaphoretic. No erythema.   Psychiatric: He has a normal mood and affect. His behavior is normal.   Nursing note and vitals reviewed.      Significant Labs: All pertinent labs within the past 24 hours have been reviewed.    Significant Imaging:   Imaging Results          CT Chest With Contrast (Final result)  Result time 02/03/20 08:12:36    Final result by Usama Florian MD (02/03/20 08:12:36)                 Impression:      Patchy nodular infiltrate within the left lower lobe could reflect infectious or inflammatory pneumonitis.    Small left-sided pneumothorax without mass effect.    Mild aneurysmal dilatation of the ascending aorta measuring 4.3 cm.    For multiple solid nodules with any 6 mm or greater, Fleischner Society guidelines recommend follow up with non-contrast chest CT at 3-6 months and 18-24 months after discovery.    All CT scans at this facility use dose modulation, iterative reconstruction and/or weight based dosing when appropriate to reduce radiation dose to as low as reasonably achievable.      Electronically signed by: Usama Florian MD  Date:    02/03/2020  Time:    08:12             Narrative:    EXAMINATION:  CT CHEST WITH CONTRAST    CLINICAL HISTORY:  Shortness of breath;    TECHNIQUE:  The chest was surveyed from the apices through the posterior costophrenic angles after administration of 75 cc of Omni 350 contrast.  Data was  reconstructed for multiplanar images in axial, sagittal and coronal planes in for maximal intensity projection images in the axial plane.    COMPARISON:  Chest x-ray 02/2/2020    FINDINGS:  Base of Neck: No significant abnormality.    Airways: Patent.    Lungs: Patchy nodular infiltrate within the left lower lobe could reflect infectious or inflammatory pneumonitis.  3 mm nodule left upper lobe 6 mm nodule lingula image 33 sequence 601 3 mm nodule lingula.  Mild dependent changes seen bilaterally.  Six mm nodule right lower lobe image 48 sequence 601.  4 mm nodule right lower lobe.  4 mm nodule right upper lobe.  For multiple solid nodules with any 6 mm or greater, Fleischner Society guidelines recommend follow up with non-contrast chest CT at 3-6 months and 18-24 months after discovery.    Pleura: Small left-sided pneumothorax.  No pleural fluid.No pleural calcification.    Ema/Mediastinum: Shotty sanju enlargement.    Esophagus: Normal.    Heart/pericardium: Mild cardiomegaly.  No pericardial effusion or calcification.    Pulmonary vasculature: Pulmonary arteries distribute normally.  No evidence of pulmonary embolism.  No evidence of pulmonary hypertension.  There are four pulmonary veins.    Aorta: Left-sided aortic arch with 3 arterial branches.  Ascending aorta measures 4.3 cm.  There is mild calcification of the thoracic aorta.  Tortuosity of the descending aorta can be seen with chronic hypertension.   There is  moderate coronary artery calcification.    Thoracic soft tissues: Normal. Both breasts are present.    Bones: No acute fracture.  Mild degenerative changes.  No suspicious lytic or sclerotic lesion.    Upper Abdomen: Mild constipation.  No acute abnormality of the partially imaged upper abdomen.                               X-Ray Chest AP Portable (Final result)  Result time 02/03/20 00:06:13    Final result by Blue Gamino MD (02/03/20 00:06:13)                 Impression:      1.  Mild basilar  interstitial changes.  Cardiac silhouette size enlargement.  A function of mild interstitial pulmonary edema or interstitial infectious process difficult to exclude in the right clinical setting.    2.  Stable findings as noted above.      Electronically signed by: Blue Gamino MD  Date:    02/03/2020  Time:    00:06             Narrative:    EXAMINATION:  XR CHEST AP PORTABLE    CLINICAL HISTORY:  fever;    COMPARISON:  Studies dating back to February 2, 2018    FINDINGS:  There are mild basilar interstitial changes.  The lungs are free of alveolar opacities.  The cardiac silhouette size is enlarged.  The trachea is midline and the mediastinal width is normal. Negative for focal infiltrate, effusion or pneumothorax. Pulmonary vasculature is mildly congested.  Negative for osseous abnormalities. Tortuous aorta with calcifications of the aortic knob.  Degenerative changes of the spine and both shoulder girdles with convex-left curvature.                                    Assessment/Plan:      * Community acquired pneumonia  2/3:   Rocephin and azithromycin  duonebs   Prednisone   O2 prn   The patient continue to have increased WOB and requiring supplemental O2. Currently weaned to 3LNC. Continue currentl management with ABX and Neb tx.         Type 2 diabetes mellitus, with long-term current use of insulin  2/3:  Sliding scale  Ada diet       Essential hypertension  2/3:  Resumed home norvasc  Hydralazine PRN       Pneumothorax  2/3:  Appears to be small  No respiratory distress currently  Will need to monitor for worsening  Repeat imaging in am if needed         VTE Risk Mitigation (From admission, onward)         Ordered     enoxaparin injection 40 mg  Daily      02/03/20 0608                      Ivan Brooks NP  Department of Hospital Medicine   [facility]

## 2020-02-03 NOTE — ASSESSMENT & PLAN NOTE
2/3:  Appears to be small  No respiratory distress currently  Will need to monitor for worsening  Repeat imaging in am if needed

## 2020-02-03 NOTE — ED PROVIDER NOTES
Encounter Date: 2/2/2020       History     Chief Complaint   Patient presents with    Cough     + chest congestion for 2 days, states it has gotten worse tonight      Patient is a 59-year-old male who presents today with complaints of cough and dyspnea.  He states that he started with a cough and chest congestion 1 week ago.  States the symptoms gradually worsened.  He reports dyspnea for the past 2 days.  Reports a T-max of 99°.  Denies chest pain, pedal edema, leg pain, nausea/vomiting, diaphoresis, sore throat, rhinorrhea, nasal congestion.  Cough is productive.  No prior physician evaluation/treatment.        Review of patient's allergies indicates:   Allergen Reactions    Sulfa (sulfonamide antibiotics) Itching     Past Medical History:   Diagnosis Date    Diabetes mellitus     High cholesterol     Hypertension     Insomnia      Past Surgical History:   Procedure Laterality Date    CERVICAL DISCECTOMY      KNEE ARTHROSCOPY      RHINOPLASTY       History reviewed. No pertinent family history.  Social History     Tobacco Use    Smoking status: Never Smoker    Smokeless tobacco: Never Used   Substance Use Topics    Alcohol use: Yes    Drug use: No     Review of Systems   Constitutional: Negative for chills, diaphoresis and fever.   HENT: Negative for congestion, rhinorrhea and sore throat.    Eyes: Negative for pain, redness and visual disturbance.   Respiratory: Positive for cough and shortness of breath.    Cardiovascular: Negative for chest pain, palpitations and leg swelling.   Gastrointestinal: Negative for abdominal distention, abdominal pain, blood in stool, constipation, diarrhea, nausea and vomiting.   Genitourinary: Negative for dysuria and hematuria.   Musculoskeletal: Negative for arthralgias and joint swelling.   Skin: Negative for rash and wound.   Neurological: Negative for seizures, syncope and headaches.   All other systems reviewed and are negative.      Physical Exam     Initial  Vitals [02/02/20 2330]   BP Pulse Resp Temp SpO2   (!) 188/104 78 20 99.5 °F (37.5 °C) 96 %      MAP       --         Physical Exam    Nursing note and vitals reviewed.  Constitutional: He appears well-developed and well-nourished. No distress.   HENT:   Head: Normocephalic and atraumatic.   Mouth/Throat: Oropharynx is clear and moist.   Eyes: Conjunctivae and EOM are normal. Pupils are equal, round, and reactive to light.   Neck: Neck supple. No tracheal deviation present.   Cardiovascular: Normal rate, regular rhythm, normal heart sounds and intact distal pulses.   Pulmonary/Chest: He has no wheezes.   Left lower lobe coarse breath sounds   Abdominal: Soft. He exhibits no distension. There is no tenderness. There is no rebound and no guarding.   Musculoskeletal: Normal range of motion. He exhibits no edema or tenderness.   Neurological: He is alert and oriented to person, place, and time. GCS score is 15. GCS eye subscore is 4. GCS verbal subscore is 5. GCS motor subscore is 6.   No focal deficits   Skin: Skin is warm. No rash noted. No erythema.   Psychiatric: He has a normal mood and affect. His behavior is normal.         ED Course   Critical Care  Date/Time: 2/3/2020 3:00 AM  Performed by: Usama Marks MD  Authorized by: Usama Marks MD   Direct patient critical care time: 10 minutes  Additional history critical care time: 5 minutes  Ordering / reviewing critical care time: 10 minutes  Documentation critical care time: 5 minutes  Consulting other physicians critical care time: 5 minutes  Total critical care time (exclusive of procedural time) : 35 minutes  Critical care time was exclusive of separately billable procedures and treating other patients and teaching time.  Critical care was necessary to treat or prevent imminent or life-threatening deterioration of the following conditions: Bilateral pneumonia and pneumothorax requiring multiple IV antibiotics, non-rebreather, and admission.  Critical care  was time spent personally by me on the following activities: blood draw for specimens, development of treatment plan with patient or surrogate, discussions with consultants, interpretation of cardiac output measurements, evaluation of patient's response to treatment, examination of patient, obtaining history from patient or surrogate, ordering and performing treatments and interventions, ordering and review of laboratory studies, ordering and review of radiographic studies, pulse oximetry, re-evaluation of patient's condition and review of old charts.        Labs Reviewed   CBC W/ AUTO DIFFERENTIAL   B-TYPE NATRIURETIC PEPTIDE   COMPREHENSIVE METABOLIC PANEL   TROPONIN I     Results for orders placed or performed during the hospital encounter of 02/02/20   Influenza A & B by Molecular   Result Value Ref Range    Influenza A, Molecular Negative Negative    Influenza B, Molecular Negative Negative    Flu A & B Source NP    CBC auto differential   Result Value Ref Range    WBC 8.32 3.90 - 12.70 K/uL    RBC 4.51 (L) 4.60 - 6.20 M/uL    Hemoglobin 12.5 (L) 14.0 - 18.0 g/dL    Hematocrit 39.0 (L) 40.0 - 54.0 %    Mean Corpuscular Volume 87 82 - 98 fL    Mean Corpuscular Hemoglobin 27.7 27.0 - 31.0 pg    Mean Corpuscular Hemoglobin Conc 32.1 32.0 - 36.0 g/dL    RDW 13.6 11.5 - 14.5 %    Platelets 265 150 - 350 K/uL    MPV 10.1 9.2 - 12.9 fL    Immature Granulocytes 0.5 0.0 - 0.5 %    Gran # (ANC) 6.5 1.8 - 7.7 K/uL    Immature Grans (Abs) 0.04 0.00 - 0.04 K/uL    Lymph # 0.8 (L) 1.0 - 4.8 K/uL    Mono # 0.8 0.3 - 1.0 K/uL    Eos # 0.1 0.0 - 0.5 K/uL    Baso # 0.03 0.00 - 0.20 K/uL    nRBC 0 0 /100 WBC    Gran% 77.9 (H) 38.0 - 73.0 %    Lymph% 9.6 (L) 18.0 - 48.0 %    Mono% 9.9 4.0 - 15.0 %    Eosinophil% 1.7 0.0 - 8.0 %    Basophil% 0.4 0.0 - 1.9 %    Differential Method Automated    Brain natriuretic peptide   Result Value Ref Range     (H) 0 - 99 pg/mL   Comprehensive metabolic panel   Result Value Ref Range     Sodium 141 136 - 145 mmol/L    Potassium 3.8 3.5 - 5.1 mmol/L    Chloride 102 95 - 110 mmol/L    CO2 26 23 - 29 mmol/L    Glucose 250 (H) 70 - 110 mg/dL    BUN, Bld 13 6 - 20 mg/dL    Creatinine 1.1 0.5 - 1.4 mg/dL    Calcium 10.0 8.7 - 10.5 mg/dL    Total Protein 7.8 6.0 - 8.4 g/dL    Albumin 3.8 3.5 - 5.2 g/dL    Total Bilirubin 1.1 (H) 0.1 - 1.0 mg/dL    Alkaline Phosphatase 95 55 - 135 U/L    AST 14 10 - 40 U/L    ALT 16 10 - 44 U/L    Anion Gap 13 8 - 16 mmol/L    eGFR if African American >60.0 >60 mL/min/1.73 m^2    eGFR if non African American >60.0 >60 mL/min/1.73 m^2   Troponin I   Result Value Ref Range    Troponin I 0.009 0.000 - 0.026 ng/mL            Imaging Results          CT Chest With Contrast (In process)                X-Ray Chest AP Portable (Final result)  Result time 02/03/20 00:06:13    Final result by Blue Gamino MD (02/03/20 00:06:13)                 Impression:      1.  Mild basilar interstitial changes.  Cardiac silhouette size enlargement.  A function of mild interstitial pulmonary edema or interstitial infectious process difficult to exclude in the right clinical setting.    2.  Stable findings as noted above.      Electronically signed by: Blue Gamino MD  Date:    02/03/2020  Time:    00:06             Narrative:    EXAMINATION:  XR CHEST AP PORTABLE    CLINICAL HISTORY:  fever;    COMPARISON:  Studies dating back to February 2, 2018    FINDINGS:  There are mild basilar interstitial changes.  The lungs are free of alveolar opacities.  The cardiac silhouette size is enlarged.  The trachea is midline and the mediastinal width is normal. Negative for focal infiltrate, effusion or pneumothorax. Pulmonary vasculature is mildly congested.  Negative for osseous abnormalities. Tortuous aorta with calcifications of the aortic knob.  Degenerative changes of the spine and both shoulder girdles with convex-left curvature.                              EKG reviewed interpreted by ED provider as  normal sinus rhythm with a rate of 78, no prolonged intervals, no significant ST-T abnormalities including no STEMI    CT CHEST STATRAD IMPRESSION:   - small left pneumothorax comprising approximately 10% of the lung volume.  - patchy ground-glass opacities within the lower lobes suggesting inflammatory or infectious process.  - aneurysmal dilation of the ascending aorta measuring up to 4.4 cm.  No dissection.    Medications   cefTRIAXone (ROCEPHIN) 2 g in dextrose 5 % 50 mL IVPB (has no administration in time range)   azithromycin 500 mg in dextrose 5 % 250 mL IVPB (ready to mix system) (has no administration in time range)   sodium chloride 0.9% bolus 1,000 mL (has no administration in time range)   iohexol (OMNIPAQUE 350) injection 75 mL (75 mLs Intravenous Given 2/3/20 0136)   benzonatate capsule 200 mg (200 mg Oral Given 2/3/20 0255)   morphine injection 2 mg (2 mg Intravenous Given 2/3/20 0256)          Medical Decision Making:   Patient presenting with cough x1 week with development of dyspnea; CT imaging revealed small left pneumothorax less than 10% with bilateral pneumonia; patient treated with Rocephin and azithromycin; placed on non-rebreather for small pneumothorax; patient also given antitussives; patient was informed of the incidental lung nodules and advised to follow up outpatient for long-term management; patient also informed of his ascending aorta aneurysm; patient states that he is aware his ascending aorta aneurysm and it is currently being followed outpatient; patient will need observation for pneumonia treatment and small pneumothorax observation/management; patient will need transfer since we do not inpatient beds at this facility; patient was offered transfer to [facility], and patient accepted; patient family voiced understanding of the plan of care including the need for transfer; Dr. Lopez is the accepting physician at [facility]; patient will be transferred via [facility]  ambulance with IV antibiotics and non-rebreather en route                                 Clinical Impression:   Final diagnoses:  [R06.00] Dyspnea  [I71.2] Thoracic aortic aneurysm without rupture (Primary)  [R91.1] Pulmonary nodule  [J93.9] Pneumothorax on left  [J18.9] Pneumonia of both lungs due to infectious organism, unspecified part of lung  [R73.9] Hyperglycemia      Disposition:   Disposition: Placed in Observation  Condition: Stable                     Usama Marks MD  02/03/20 1001

## 2020-02-04 VITALS
BODY MASS INDEX: 36.07 KG/M2 | WEIGHT: 224.44 LBS | DIASTOLIC BLOOD PRESSURE: 67 MMHG | OXYGEN SATURATION: 94 % | TEMPERATURE: 98 F | SYSTOLIC BLOOD PRESSURE: 133 MMHG | HEIGHT: 66 IN | HEART RATE: 67 BPM | RESPIRATION RATE: 18 BRPM

## 2020-02-04 LAB
ALBUMIN SERPL BCP-MCNC: 3.2 G/DL (ref 3.5–5.2)
ALP SERPL-CCNC: 88 U/L (ref 55–135)
ALT SERPL W/O P-5'-P-CCNC: 11 U/L (ref 10–44)
ANION GAP SERPL CALC-SCNC: 9 MMOL/L (ref 8–16)
AST SERPL-CCNC: 12 U/L (ref 10–40)
BASOPHILS # BLD AUTO: 0.03 K/UL (ref 0–0.2)
BASOPHILS NFR BLD: 0.4 % (ref 0–1.9)
BILIRUB SERPL-MCNC: 0.9 MG/DL (ref 0.1–1)
BUN SERPL-MCNC: 12 MG/DL (ref 6–20)
CALCIUM SERPL-MCNC: 9.1 MG/DL (ref 8.7–10.5)
CHLORIDE SERPL-SCNC: 103 MMOL/L (ref 95–110)
CO2 SERPL-SCNC: 27 MMOL/L (ref 23–29)
CREAT SERPL-MCNC: 0.9 MG/DL (ref 0.5–1.4)
DIFFERENTIAL METHOD: ABNORMAL
EOSINOPHIL # BLD AUTO: 0.1 K/UL (ref 0–0.5)
EOSINOPHIL NFR BLD: 1.9 % (ref 0–8)
ERYTHROCYTE [DISTWIDTH] IN BLOOD BY AUTOMATED COUNT: 13.5 % (ref 11.5–14.5)
EST. GFR  (AFRICAN AMERICAN): >60 ML/MIN/1.73 M^2
EST. GFR  (NON AFRICAN AMERICAN): >60 ML/MIN/1.73 M^2
GLUCOSE SERPL-MCNC: 184 MG/DL (ref 70–110)
HCT VFR BLD AUTO: 36.9 % (ref 40–54)
HGB BLD-MCNC: 11.3 G/DL (ref 14–18)
IMM GRANULOCYTES # BLD AUTO: 0.09 K/UL (ref 0–0.04)
IMM GRANULOCYTES NFR BLD AUTO: 1.3 % (ref 0–0.5)
LYMPHOCYTES # BLD AUTO: 1.1 K/UL (ref 1–4.8)
LYMPHOCYTES NFR BLD: 16.3 % (ref 18–48)
MCH RBC QN AUTO: 27.3 PG (ref 27–31)
MCHC RBC AUTO-ENTMCNC: 30.6 G/DL (ref 32–36)
MCV RBC AUTO: 89 FL (ref 82–98)
MONOCYTES # BLD AUTO: 0.6 K/UL (ref 0.3–1)
MONOCYTES NFR BLD: 9 % (ref 4–15)
NEUTROPHILS # BLD AUTO: 5 K/UL (ref 1.8–7.7)
NEUTROPHILS NFR BLD: 71.1 % (ref 38–73)
NRBC BLD-RTO: 0 /100 WBC
PLATELET # BLD AUTO: 262 K/UL (ref 150–350)
PMV BLD AUTO: 10 FL (ref 9.2–12.9)
POCT GLUCOSE: 189 MG/DL (ref 70–110)
POTASSIUM SERPL-SCNC: 3.6 MMOL/L (ref 3.5–5.1)
PROT SERPL-MCNC: 6.9 G/DL (ref 6–8.4)
RBC # BLD AUTO: 4.14 M/UL (ref 4.6–6.2)
SODIUM SERPL-SCNC: 139 MMOL/L (ref 136–145)
WBC # BLD AUTO: 6.98 K/UL (ref 3.9–12.7)

## 2020-02-04 PROCEDURE — 96375 TX/PRO/DX INJ NEW DRUG ADDON: CPT | Performed by: EMERGENCY MEDICINE

## 2020-02-04 PROCEDURE — 94640 AIRWAY INHALATION TREATMENT: CPT

## 2020-02-04 PROCEDURE — 80053 COMPREHEN METABOLIC PANEL: CPT

## 2020-02-04 PROCEDURE — 36415 COLL VENOUS BLD VENIPUNCTURE: CPT

## 2020-02-04 PROCEDURE — 63600175 PHARM REV CODE 636 W HCPCS: Performed by: FAMILY MEDICINE

## 2020-02-04 PROCEDURE — 25000003 PHARM REV CODE 250: Performed by: NURSE PRACTITIONER

## 2020-02-04 PROCEDURE — 25000003 PHARM REV CODE 250: Performed by: FAMILY MEDICINE

## 2020-02-04 PROCEDURE — G0378 HOSPITAL OBSERVATION PER HR: HCPCS

## 2020-02-04 PROCEDURE — 25000242 PHARM REV CODE 250 ALT 637 W/ HCPCS: Performed by: FAMILY MEDICINE

## 2020-02-04 PROCEDURE — 96376 TX/PRO/DX INJ SAME DRUG ADON: CPT | Performed by: EMERGENCY MEDICINE

## 2020-02-04 PROCEDURE — 85025 COMPLETE CBC W/AUTO DIFF WBC: CPT

## 2020-02-04 PROCEDURE — 94761 N-INVAS EAR/PLS OXIMETRY MLT: CPT

## 2020-02-04 RX ORDER — PREDNISONE 20 MG/1
20 TABLET ORAL DAILY
Qty: 5 TABLET | Refills: 0 | Status: SHIPPED | OUTPATIENT
Start: 2020-02-05 | End: 2020-02-10

## 2020-02-04 RX ORDER — AMOXICILLIN AND CLAVULANATE POTASSIUM 875; 125 MG/1; MG/1
1 TABLET, FILM COATED ORAL 2 TIMES DAILY
Qty: 20 TABLET | Refills: 0 | Status: SHIPPED | OUTPATIENT
Start: 2020-02-04 | End: 2020-02-14

## 2020-02-04 RX ORDER — PROMETHAZINE HYDROCHLORIDE AND CODEINE PHOSPHATE 6.25; 1 MG/5ML; MG/5ML
5 SOLUTION ORAL EVERY 4 HOURS PRN
Qty: 118 ML | Refills: 0 | Status: SHIPPED | OUTPATIENT
Start: 2020-02-04 | End: 2020-02-14

## 2020-02-04 RX ADMIN — AZITHROMYCIN MONOHYDRATE 250 MG: 250 TABLET ORAL at 08:02

## 2020-02-04 RX ADMIN — PROMETHAZINE HYDROCHLORIDE AND CODEINE PHOSPHATE 5 ML: 6.25; 1 SOLUTION ORAL at 04:02

## 2020-02-04 RX ADMIN — PROMETHAZINE HYDROCHLORIDE AND CODEINE PHOSPHATE 5 ML: 6.25; 1 SOLUTION ORAL at 09:02

## 2020-02-04 RX ADMIN — CANDESARTAN 8 MG: 4 TABLET ORAL at 08:02

## 2020-02-04 RX ADMIN — IPRATROPIUM BROMIDE AND ALBUTEROL SULFATE 3 ML: .5; 3 SOLUTION RESPIRATORY (INHALATION) at 07:02

## 2020-02-04 RX ADMIN — PREDNISONE 20 MG: 20 TABLET ORAL at 08:02

## 2020-02-04 RX ADMIN — CEFTRIAXONE 1 G: 1 INJECTION, SOLUTION INTRAVENOUS at 02:02

## 2020-02-04 RX ADMIN — HYDRALAZINE HYDROCHLORIDE 10 MG: 20 INJECTION INTRAMUSCULAR; INTRAVENOUS at 04:02

## 2020-02-04 RX ADMIN — ASPIRIN 81 MG: 81 TABLET ORAL at 08:02

## 2020-02-04 RX ADMIN — ESCITALOPRAM OXALATE 20 MG: 10 TABLET ORAL at 08:02

## 2020-02-04 RX ADMIN — ATORVASTATIN CALCIUM 40 MG: 40 TABLET, FILM COATED ORAL at 08:02

## 2020-02-04 RX ADMIN — AMLODIPINE BESYLATE 10 MG: 10 TABLET ORAL at 08:02

## 2020-02-04 NOTE — DISCHARGE SUMMARY
[facility] Hospital Medicine  Discharge Summary      Patient Name: Rafael Maharaj  MRN: [id number]  Admission Date: 2/2/2020  Hospital Length of Stay: 0 days  Discharge Date and Time:  02/04/2020 11:36 AM  Attending Physician: Lenny Lynn MD   Discharging Provider: Ivan Brooks NP  Primary Care Provider: Leon Mitchell MD      HPI:   Patient is a 59-year-old male with a PMH of HTN and DM who presents as transfer from [facility] with complaints of cough and dyspnea. He states that he started with a cough and chest congestion 1 week ago and symptoms has been getting worse ever since. 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 He reports dyspnea for the past 2 days. Reports a T-max of 99.1.  Denies chest pain, pedal edema, leg pain, nausea/vomiting.  Cough is productive.  No prior physician evaluation/treatment. Denies any history of smoking.     While at [facility], chest xray was concerning for vascular congestion. Per report Ct chest showed infiltrates and small < 10% pneumothorax. Awaiting final CT read. He was started on rocephin and azithromycin and transferred here. Patient is full code. Surrogate decision maker is wife.     * No surgery found *      Hospital Course:   2/3/20 The patient continue to have increased WOB and requiring supplemental O2. Currently weaned to 3LNC. Continue currentl management with ABX and Neb tx. 2/4/20 No acute issues overnight. The patient reports improvement in symptoms overnight. No resp distress noted on exam. The patient was seen and examined today and deemed stable for discharge. The patient will complete a course of levaquin and will follow up with his PCP.      Consults:     No new Assessment & Plan notes have been filed under this hospital service since the last note was generated.  Service: Hospital Medicine    Final Active Diagnoses:    Diagnosis Date Noted  POA    PRINCIPAL PROBLEM:  Community acquired pneumonia [J18.9] 02/03/2020 Yes    Pneumothorax [J93.9] 02/03/2020 Yes    Essential hypertension [I10] 02/03/2020 Yes    Type 2 diabetes mellitus, with long-term current use of insulin [E11.9, Z79.4] 02/03/2020 Not Applicable      Problems Resolved During this Admission:       Discharged Condition: stable    Disposition: Home or Self Care    Follow Up:  Follow-up Information     Leon Mitchell MD. Schedule an appointment as soon as possible for a visit in 3 days.    Specialty:  Internal Medicine  Contact information:  [address] [facility]  [place] [address]  [phone]                 Patient Instructions:   No discharge procedures on file.    Significant Diagnostic Studies:   Imaging Results          CT Chest With Contrast (Final result)  Result time 02/03/20 08:12:36    Final result by Usama Florian MD (02/03/20 08:12:36)                 Impression:      Patchy nodular infiltrate within the left lower lobe could reflect infectious or inflammatory pneumonitis.    Small left-sided pneumothorax without mass effect.    Mild aneurysmal dilatation of the ascending aorta measuring 4.3 cm.    For multiple solid nodules with any 6 mm or greater, Fleischner Society guidelines recommend follow up with non-contrast chest CT at 3-6 months and 18-24 months after discovery.    All CT scans at this facility use dose modulation, iterative reconstruction and/or weight based dosing when appropriate to reduce radiation dose to as low as reasonably achievable.      Electronically signed by: Usama Florian MD  Date:    02/03/2020  Time:    08:12             Narrative:    EXAMINATION:  CT CHEST WITH CONTRAST    CLINICAL HISTORY:  Shortness of breath;    TECHNIQUE:  The chest was surveyed from the apices through the posterior costophrenic angles after administration of 75 cc of Omni 350 contrast.  Data was reconstructed for multiplanar images in axial, sagittal and coronal  planes in for maximal intensity projection images in the axial plane.    COMPARISON:  Chest x-ray 02/2/2020    FINDINGS:  Base of Neck: No significant abnormality.    Airways: Patent.    Lungs: Patchy nodular infiltrate within the left lower lobe could reflect infectious or inflammatory pneumonitis.  3 mm nodule left upper lobe 6 mm nodule lingula image 33 sequence 601 3 mm nodule lingula.  Mild dependent changes seen bilaterally.  Six mm nodule right lower lobe image 48 sequence 601.  4 mm nodule right lower lobe.  4 mm nodule right upper lobe.  For multiple solid nodules with any 6 mm or greater, Fleischner Society guidelines recommend follow up with non-contrast chest CT at 3-6 months and 18-24 months after discovery.    Pleura: Small left-sided pneumothorax.  No pleural fluid.No pleural calcification.    Ema/Mediastinum: Shotty sanju enlargement.    Esophagus: Normal.    Heart/pericardium: Mild cardiomegaly.  No pericardial effusion or calcification.    Pulmonary vasculature: Pulmonary arteries distribute normally.  No evidence of pulmonary embolism.  No evidence of pulmonary hypertension.  There are four pulmonary veins.    Aorta: Left-sided aortic arch with 3 arterial branches.  Ascending aorta measures 4.3 cm.  There is mild calcification of the thoracic aorta.  Tortuosity of the descending aorta can be seen with chronic hypertension.   There is  moderate coronary artery calcification.    Thoracic soft tissues: Normal. Both breasts are present.    Bones: No acute fracture.  Mild degenerative changes.  No suspicious lytic or sclerotic lesion.    Upper Abdomen: Mild constipation.  No acute abnormality of the partially imaged upper abdomen.                               X-Ray Chest AP Portable (Final result)  Result time 02/03/20 00:06:13    Final result by Blue Gamino MD (02/03/20 00:06:13)                 Impression:      1.  Mild basilar interstitial changes.  Cardiac silhouette size enlargement.  A  function of mild interstitial pulmonary edema or interstitial infectious process difficult to exclude in the right clinical setting.    2.  Stable findings as noted above.      Electronically signed by: Blue Gamino MD  Date:    02/03/2020  Time:    00:06             Narrative:    EXAMINATION:  XR CHEST AP PORTABLE    CLINICAL HISTORY:  fever;    COMPARISON:  Studies dating back to February 2, 2018    FINDINGS:  There are mild basilar interstitial changes.  The lungs are free of alveolar opacities.  The cardiac silhouette size is enlarged.  The trachea is midline and the mediastinal width is normal. Negative for focal infiltrate, effusion or pneumothorax. Pulmonary vasculature is mildly congested.  Negative for osseous abnormalities. Tortuous aorta with calcifications of the aortic knob.  Degenerative changes of the spine and both shoulder girdles with convex-left curvature.                                  Pending Diagnostic Studies:     None         Medications:  Reconciled Home Medications:      Medication List      START taking these medications    amoxicillin-clavulanate 875-125mg 875-125 mg per tablet  Commonly known as:  AUGMENTIN  Take 1 tablet by mouth 2 (two) times daily. for 10 days     predniSONE 20 MG tablet  Commonly known as:  DELTASONE  Take 1 tablet (20 mg total) by mouth once daily. for 5 days  Start taking on:  February 5, 2020     promethazine-codeine 6.25-10 mg/5 ml 6.25-10 mg/5 mL syrup  Commonly known as:  PHENERGAN with CODEINE  Take 5 mLs by mouth every 4 (four) hours as needed for Cough.        CONTINUE taking these medications    amlodipine-olmesartan 10-40 mg per tablet  Commonly known as:  IAN  Take 1 tablet by mouth once daily.     aspirin 81 MG EC tablet  Commonly known as:  ECOTRIN  Take 81 mg by mouth once daily.     atorvastatin 40 MG tablet  Commonly known as:  LIPITOR  Take 40 mg by mouth once daily.     Bystolic 20 mg Tab  Generic drug:  nebivolol  Take by mouth every  evening.     escitalopram oxalate 20 MG tablet  Commonly known as:  LEXAPRO  Take 1 tablet by mouth once daily.     insulin glargine 100 units/mL (3mL) SubQ pen  Inject into the skin every evening. Start with 20 units at bedtime then add 1 unit every day until blood sugar is less than 140     metFORMIN 750 MG 24 hr tablet  Commonly known as:  GLUCOPHAGE-XR  Take 750 mg by mouth 2 (two) times daily.     pioglitazone-metformin  mg per tablet  Commonly known as:  ACTOPLUS MET  Take 1 tablet by mouth 2 (two) times daily.     triazolam 0.25 MG Tab  Commonly known as:  HALCION  Take 0.125 mg by mouth nightly as needed.        STOP taking these medications    clindamycin 300 MG capsule  Commonly known as:  CLEOCIN            Indwelling Lines/Drains at time of discharge:   Lines/Drains/Airways     None                 Time spent on the discharge of patient: >35  minutes  Patient was seen and examined on the date of discharge and determined to be suitable for discharge.         Ivan Brooks NP  Department of Hospital Medicine  [facility]

## 2020-02-04 NOTE — PLAN OF CARE
Blood glucose being monitored. Safety measures are in place. Call bell within reach. Pain being managed. Pt free of falls. Will continue to monitor. Chart check complete.

## 2020-02-04 NOTE — NURSING
Patient stable for discharge. IV removed. Patient received and verbalizes understanding of discharge instructions. Patient waiting for prescriptions from pharmacy.

## 2020-02-08 LAB
BACTERIA BLD CULT: NORMAL
BACTERIA BLD CULT: NORMAL

## 2021-07-05 ENCOUNTER — HOSPITAL ENCOUNTER (EMERGENCY)
Facility: HOSPITAL | Age: 61
Discharge: HOME | End: 2021-07-05
Attending: EMERGENCY MEDICINE
Payer: COMMERCIAL

## 2021-07-05 VITALS
RESPIRATION RATE: 18 BRPM | DIASTOLIC BLOOD PRESSURE: 87 MMHG | HEIGHT: 66 IN | HEART RATE: 68 BPM | OXYGEN SATURATION: 97 % | SYSTOLIC BLOOD PRESSURE: 183 MMHG | WEIGHT: 218.69 LBS | TEMPERATURE: 99 F | BODY MASS INDEX: 35.15 KG/M2

## 2021-07-05 DIAGNOSIS — M25.551 RIGHT HIP PAIN: Primary | ICD-10-CM

## 2021-07-05 PROCEDURE — 99283 EMERGENCY DEPT VISIT LOW MDM: CPT | Mod: 25,ER

## 2021-07-05 PROCEDURE — 25000003 PHARM REV CODE 250: Mod: ER | Performed by: PHYSICIAN ASSISTANT

## 2021-07-05 RX ORDER — HYDROCODONE BITARTRATE AND ACETAMINOPHEN 10; 325 MG/1; MG/1
1 TABLET ORAL
Status: COMPLETED | OUTPATIENT
Start: 2021-07-05 | End: 2021-07-05

## 2021-07-05 RX ORDER — TRAMADOL HYDROCHLORIDE 50 MG/1
50 TABLET ORAL EVERY 6 HOURS PRN
Qty: 12 TABLET | Refills: 0 | Status: SHIPPED | OUTPATIENT
Start: 2021-07-05 | End: 2021-09-06 | Stop reason: ALTCHOICE

## 2021-07-05 RX ADMIN — HYDROCODONE BITARTRATE AND ACETAMINOPHEN 1 TABLET: 10; 325 TABLET ORAL at 01:07

## 2021-07-19 ENCOUNTER — TELEPHONE (OUTPATIENT)
Dept: RADIOLOGY | Facility: HOSPITAL | Age: 61
End: 2021-07-19

## 2021-07-22 ENCOUNTER — TELEPHONE (OUTPATIENT)
Dept: RADIOLOGY | Facility: HOSPITAL | Age: 61
End: 2021-07-22

## 2021-07-23 ENCOUNTER — HOSPITAL ENCOUNTER (OUTPATIENT)
Dept: RADIOLOGY | Facility: HOSPITAL | Age: 61
Discharge: HOME | End: 2021-07-23
Attending: ORTHOPAEDIC SURGERY
Payer: COMMERCIAL

## 2021-07-23 DIAGNOSIS — M54.50 LOW BACK PAIN: ICD-10-CM

## 2021-07-23 PROCEDURE — 72148 MRI LUMBAR SPINE W/O DYE: CPT | Mod: 26,,, | Performed by: RADIOLOGY

## 2021-07-23 PROCEDURE — 72148 MRI LUMBAR SPINE WITHOUT CONTRAST: ICD-10-PCS | Mod: 26,,, | Performed by: RADIOLOGY

## 2021-07-23 PROCEDURE — 72148 MRI LUMBAR SPINE W/O DYE: CPT | Mod: TC,PO

## 2021-09-06 ENCOUNTER — HOSPITAL ENCOUNTER (EMERGENCY)
Facility: HOSPITAL | Age: 61
Discharge: HOME | End: 2021-09-06
Attending: EMERGENCY MEDICINE
Payer: COMMERCIAL

## 2021-09-06 VITALS
BODY MASS INDEX: 32.95 KG/M2 | RESPIRATION RATE: 19 BRPM | WEIGHT: 205 LBS | TEMPERATURE: 98 F | OXYGEN SATURATION: 98 % | HEART RATE: 80 BPM | SYSTOLIC BLOOD PRESSURE: 163 MMHG | HEIGHT: 66 IN | DIASTOLIC BLOOD PRESSURE: 88 MMHG

## 2021-09-06 DIAGNOSIS — B02.9 HERPES ZOSTER WITHOUT COMPLICATION: Primary | ICD-10-CM

## 2021-09-06 PROCEDURE — 99284 EMERGENCY DEPT VISIT MOD MDM: CPT | Mod: ER

## 2021-09-06 PROCEDURE — 25000003 PHARM REV CODE 250: Mod: ER | Performed by: EMERGENCY MEDICINE

## 2021-09-06 RX ORDER — OXYCODONE AND ACETAMINOPHEN 5; 325 MG/1; MG/1
1 TABLET ORAL EVERY 6 HOURS PRN
Qty: 15 TABLET | Refills: 0 | Status: SHIPPED | OUTPATIENT
Start: 2021-09-06

## 2021-09-06 RX ORDER — OXYCODONE AND ACETAMINOPHEN 10; 325 MG/1; MG/1
1 TABLET ORAL
Status: COMPLETED | OUTPATIENT
Start: 2021-09-06 | End: 2021-09-06

## 2021-09-06 RX ORDER — VALACYCLOVIR HYDROCHLORIDE 1 G/1
1000 TABLET, FILM COATED ORAL 3 TIMES DAILY
Qty: 21 TABLET | Refills: 0 | Status: SHIPPED | OUTPATIENT
Start: 2021-09-06 | End: 2021-09-13

## 2021-09-06 RX ADMIN — OXYCODONE HYDROCHLORIDE AND ACETAMINOPHEN 1 TABLET: 10; 325 TABLET ORAL at 02:09

## 2022-10-20 ENCOUNTER — PATIENT MESSAGE (OUTPATIENT)
Dept: RESEARCH | Facility: HOSPITAL | Age: 62
End: 2022-10-20
Payer: COMMERCIAL

## 2022-11-15 ENCOUNTER — PATIENT MESSAGE (OUTPATIENT)
Dept: RESEARCH | Facility: HOSPITAL | Age: 62
End: 2022-11-15
Payer: COMMERCIAL

## 2023-05-03 ENCOUNTER — PATIENT MESSAGE (OUTPATIENT)
Dept: RESEARCH | Facility: HOSPITAL | Age: 63
End: 2023-05-03
Payer: COMMERCIAL

## 2025-02-11 ENCOUNTER — HOSPITAL ENCOUNTER (EMERGENCY)
Facility: HOSPITAL | Age: 65
Discharge: HOME | End: 2025-02-11
Attending: EMERGENCY MEDICINE
Payer: COMMERCIAL

## 2025-02-11 ENCOUNTER — HOSPITAL ENCOUNTER (OUTPATIENT)
Facility: HOSPITAL | Age: 65
Discharge: HOME | End: 2025-02-13
Attending: EMERGENCY MEDICINE | Admitting: FAMILY MEDICINE
Payer: COMMERCIAL

## 2025-02-11 VITALS
WEIGHT: 224.13 LBS | SYSTOLIC BLOOD PRESSURE: 152 MMHG | RESPIRATION RATE: 18 BRPM | DIASTOLIC BLOOD PRESSURE: 67 MMHG | BODY MASS INDEX: 36.02 KG/M2 | HEIGHT: 66 IN | HEART RATE: 96 BPM | OXYGEN SATURATION: 97 %

## 2025-02-11 DIAGNOSIS — R07.9 CHEST PAIN: ICD-10-CM

## 2025-02-11 DIAGNOSIS — R05.1 ACUTE COUGH: ICD-10-CM

## 2025-02-11 DIAGNOSIS — J10.1 INFLUENZA A: Primary | ICD-10-CM

## 2025-02-11 DIAGNOSIS — J93.11 PRIMARY SPONTANEOUS PNEUMOTHORAX: Primary | ICD-10-CM

## 2025-02-11 LAB
ALBUMIN SERPL BCP-MCNC: 3.9 G/DL (ref 3.5–5.2)
ALP SERPL-CCNC: 78 U/L (ref 40–150)
ALT SERPL W/O P-5'-P-CCNC: 23 U/L (ref 10–44)
ANION GAP SERPL CALC-SCNC: 12 MMOL/L (ref 8–16)
AST SERPL-CCNC: 28 U/L (ref 10–40)
BASOPHILS # BLD AUTO: 0.01 K/UL (ref 0–0.2)
BASOPHILS NFR BLD: 0.2 % (ref 0–1.9)
BILIRUB SERPL-MCNC: 0.7 MG/DL (ref 0.1–1)
BUN SERPL-MCNC: 30 MG/DL (ref 8–23)
CALCIUM SERPL-MCNC: 8.8 MG/DL (ref 8.7–10.5)
CHLORIDE SERPL-SCNC: 104 MMOL/L (ref 95–110)
CO2 SERPL-SCNC: 23 MMOL/L (ref 23–29)
CREAT SERPL-MCNC: 1.5 MG/DL (ref 0.5–1.4)
CTP QC/QA: YES
CTP QC/QA: YES
DIFFERENTIAL METHOD BLD: ABNORMAL
EOSINOPHIL # BLD AUTO: 0 K/UL (ref 0–0.5)
EOSINOPHIL NFR BLD: 0.4 % (ref 0–8)
ERYTHROCYTE [DISTWIDTH] IN BLOOD BY AUTOMATED COUNT: 13.3 % (ref 11.5–14.5)
EST. GFR  (NO RACE VARIABLE): 51.3 ML/MIN/1.73 M^2
GLUCOSE SERPL-MCNC: 204 MG/DL (ref 70–110)
HCT VFR BLD AUTO: 35.6 % (ref 40–54)
HCV AB SERPL QL IA: NEGATIVE
HEP C VIRUS HOLD SPECIMEN: NORMAL
HGB BLD-MCNC: 11.6 G/DL (ref 14–18)
HIV 1+2 AB+HIV1 P24 AG SERPL QL IA: NEGATIVE
IMM GRANULOCYTES # BLD AUTO: 0.02 K/UL (ref 0–0.04)
IMM GRANULOCYTES NFR BLD AUTO: 0.4 % (ref 0–0.5)
LYMPHOCYTES # BLD AUTO: 0.4 K/UL (ref 1–4.8)
LYMPHOCYTES NFR BLD: 7.6 % (ref 18–48)
MCH RBC QN AUTO: 28.5 PG (ref 27–31)
MCHC RBC AUTO-ENTMCNC: 32.6 G/DL (ref 32–36)
MCV RBC AUTO: 88 FL (ref 82–98)
MONOCYTES # BLD AUTO: 0.1 K/UL (ref 0.3–1)
MONOCYTES NFR BLD: 2.1 % (ref 4–15)
NEUTROPHILS # BLD AUTO: 4.8 K/UL (ref 1.8–7.7)
NEUTROPHILS NFR BLD: 89.3 % (ref 38–73)
NRBC BLD-RTO: 0 /100 WBC
PLATELET # BLD AUTO: 205 K/UL (ref 150–450)
PMV BLD AUTO: 10 FL (ref 9.2–12.9)
POC MOLECULAR INFLUENZA A AGN: POSITIVE
POC MOLECULAR INFLUENZA B AGN: NEGATIVE
POCT GLUCOSE: 168 MG/DL (ref 70–110)
POCT GLUCOSE: 213 MG/DL (ref 70–110)
POTASSIUM SERPL-SCNC: 4.6 MMOL/L (ref 3.5–5.1)
PROT SERPL-MCNC: 7.6 G/DL (ref 6–8.4)
RBC # BLD AUTO: 4.07 M/UL (ref 4.6–6.2)
SARS-COV-2 RDRP RESP QL NAA+PROBE: NEGATIVE
SODIUM SERPL-SCNC: 139 MMOL/L (ref 136–145)
WBC # BLD AUTO: 5.36 K/UL (ref 3.9–12.7)

## 2025-02-11 PROCEDURE — 85025 COMPLETE CBC W/AUTO DIFF WBC: CPT | Mod: ER | Performed by: EMERGENCY MEDICINE

## 2025-02-11 PROCEDURE — 27000207 HC ISOLATION

## 2025-02-11 PROCEDURE — 86803 HEPATITIS C AB TEST: CPT | Performed by: EMERGENCY MEDICINE

## 2025-02-11 PROCEDURE — 27100171 HC OXYGEN HIGH FLOW UP TO 24 HOURS

## 2025-02-11 PROCEDURE — 99900035 HC TECH TIME PER 15 MIN (STAT): Mod: ER

## 2025-02-11 PROCEDURE — G0378 HOSPITAL OBSERVATION PER HR: HCPCS

## 2025-02-11 PROCEDURE — 96372 THER/PROPH/DIAG INJ SC/IM: CPT | Performed by: EMERGENCY MEDICINE

## 2025-02-11 PROCEDURE — 87389 HIV-1 AG W/HIV-1&-2 AB AG IA: CPT | Performed by: EMERGENCY MEDICINE

## 2025-02-11 PROCEDURE — 25000003 PHARM REV CODE 250: Mod: ER | Performed by: EMERGENCY MEDICINE

## 2025-02-11 PROCEDURE — 25000003 PHARM REV CODE 250: Performed by: FAMILY MEDICINE

## 2025-02-11 PROCEDURE — 96372 THER/PROPH/DIAG INJ SC/IM: CPT | Performed by: FAMILY MEDICINE

## 2025-02-11 PROCEDURE — 99900035 HC TECH TIME PER 15 MIN (STAT)

## 2025-02-11 PROCEDURE — 99285 EMERGENCY DEPT VISIT HI MDM: CPT | Mod: 25,27,ER

## 2025-02-11 PROCEDURE — 63600175 PHARM REV CODE 636 W HCPCS: Mod: ER | Performed by: EMERGENCY MEDICINE

## 2025-02-11 PROCEDURE — 99284 EMERGENCY DEPT VISIT MOD MDM: CPT | Mod: 25,ER

## 2025-02-11 PROCEDURE — 11000001 HC ACUTE MED/SURG PRIVATE ROOM

## 2025-02-11 PROCEDURE — G0378 HOSPITAL OBSERVATION PER HR: HCPCS | Mod: ER

## 2025-02-11 PROCEDURE — 63600175 PHARM REV CODE 636 W HCPCS: Performed by: FAMILY MEDICINE

## 2025-02-11 PROCEDURE — 87635 SARS-COV-2 COVID-19 AMP PRB: CPT | Mod: ER | Performed by: EMERGENCY MEDICINE

## 2025-02-11 PROCEDURE — 21400001 HC TELEMETRY ROOM

## 2025-02-11 PROCEDURE — 94761 N-INVAS EAR/PLS OXIMETRY MLT: CPT

## 2025-02-11 PROCEDURE — 87502 INFLUENZA DNA AMP PROBE: CPT | Mod: ER

## 2025-02-11 PROCEDURE — 80053 COMPREHEN METABOLIC PANEL: CPT | Mod: ER | Performed by: EMERGENCY MEDICINE

## 2025-02-11 RX ORDER — OSELTAMIVIR PHOSPHATE 30 MG/1
30 CAPSULE ORAL 2 TIMES DAILY
Status: DISCONTINUED | OUTPATIENT
Start: 2025-02-11 | End: 2025-02-11

## 2025-02-11 RX ORDER — OLMESARTAN MEDOXOMIL 40 MG/1
1 TABLET ORAL EVERY MORNING
COMMUNITY
Start: 2024-12-16 | End: 2025-02-17

## 2025-02-11 RX ORDER — OSELTAMIVIR PHOSPHATE 75 MG/1
75 CAPSULE ORAL
Status: COMPLETED | OUTPATIENT
Start: 2025-02-11 | End: 2025-02-11

## 2025-02-11 RX ORDER — INDAPAMIDE 2.5 MG/1
2.5 TABLET ORAL
COMMUNITY

## 2025-02-11 RX ORDER — NALOXONE HCL 0.4 MG/ML
0.02 VIAL (ML) INJECTION
Status: DISCONTINUED | OUTPATIENT
Start: 2025-02-11 | End: 2025-02-13 | Stop reason: HOSPADM

## 2025-02-11 RX ORDER — INDAPAMIDE 1.25 MG/1
2.5 TABLET ORAL DAILY
Status: DISCONTINUED | OUTPATIENT
Start: 2025-02-12 | End: 2025-02-13 | Stop reason: HOSPADM

## 2025-02-11 RX ORDER — PREDNISONE 10 MG/1
40 TABLET ORAL DAILY
Qty: 20 TABLET | Refills: 0 | Status: SHIPPED | OUTPATIENT
Start: 2025-02-11 | End: 2025-02-17

## 2025-02-11 RX ORDER — DEXAMETHASONE SODIUM PHOSPHATE 4 MG/ML
8 INJECTION, SOLUTION INTRA-ARTICULAR; INTRALESIONAL; INTRAMUSCULAR; INTRAVENOUS; SOFT TISSUE
Status: COMPLETED | OUTPATIENT
Start: 2025-02-11 | End: 2025-02-11

## 2025-02-11 RX ORDER — AMLODIPINE BESYLATE 10 MG/1
10 TABLET ORAL DAILY
Status: DISCONTINUED | OUTPATIENT
Start: 2025-02-12 | End: 2025-02-13 | Stop reason: HOSPADM

## 2025-02-11 RX ORDER — PREDNISONE 20 MG/1
40 TABLET ORAL DAILY
Status: DISCONTINUED | OUTPATIENT
Start: 2025-02-12 | End: 2025-02-13 | Stop reason: HOSPADM

## 2025-02-11 RX ORDER — ALBUTEROL SULFATE 90 UG/1
1-2 INHALANT RESPIRATORY (INHALATION) EVERY 6 HOURS PRN
Qty: 8 G | Refills: 0 | Status: SHIPPED | OUTPATIENT
Start: 2025-02-11

## 2025-02-11 RX ORDER — GLUCAGON 1 MG
1 KIT INJECTION
Status: DISCONTINUED | OUTPATIENT
Start: 2025-02-11 | End: 2025-02-13 | Stop reason: HOSPADM

## 2025-02-11 RX ORDER — ESCITALOPRAM OXALATE 5 MG/1
20 TABLET ORAL DAILY
Status: DISCONTINUED | OUTPATIENT
Start: 2025-02-12 | End: 2025-02-13 | Stop reason: HOSPADM

## 2025-02-11 RX ORDER — OSELTAMIVIR PHOSPHATE 30 MG/1
30 CAPSULE ORAL 2 TIMES DAILY
Status: DISCONTINUED | OUTPATIENT
Start: 2025-02-11 | End: 2025-02-13 | Stop reason: HOSPADM

## 2025-02-11 RX ORDER — LOSARTAN POTASSIUM 50 MG/1
100 TABLET ORAL DAILY
Status: DISCONTINUED | OUTPATIENT
Start: 2025-02-12 | End: 2025-02-13 | Stop reason: HOSPADM

## 2025-02-11 RX ORDER — TIRZEPATIDE 5 MG/.5ML
5 INJECTION, SOLUTION SUBCUTANEOUS
COMMUNITY
Start: 2025-02-14 | End: 2025-03-14

## 2025-02-11 RX ORDER — AMLODIPINE BESYLATE 10 MG/1
1 TABLET ORAL EVERY MORNING
COMMUNITY
Start: 2024-12-20 | End: 2025-02-11

## 2025-02-11 RX ORDER — OSELTAMIVIR PHOSPHATE 75 MG/1
75 CAPSULE ORAL 2 TIMES DAILY
Qty: 10 CAPSULE | Refills: 0 | Status: SHIPPED | OUTPATIENT
Start: 2025-02-11 | End: 2025-02-17

## 2025-02-11 RX ORDER — PROCHLORPERAZINE EDISYLATE 5 MG/ML
5 INJECTION INTRAMUSCULAR; INTRAVENOUS EVERY 6 HOURS PRN
Status: DISCONTINUED | OUTPATIENT
Start: 2025-02-11 | End: 2025-02-13 | Stop reason: HOSPADM

## 2025-02-11 RX ORDER — IPRATROPIUM BROMIDE AND ALBUTEROL SULFATE 2.5; .5 MG/3ML; MG/3ML
3 SOLUTION RESPIRATORY (INHALATION) EVERY 6 HOURS PRN
Status: DISCONTINUED | OUTPATIENT
Start: 2025-02-11 | End: 2025-02-13 | Stop reason: HOSPADM

## 2025-02-11 RX ORDER — IBUPROFEN 200 MG
16 TABLET ORAL
Status: DISCONTINUED | OUTPATIENT
Start: 2025-02-11 | End: 2025-02-13 | Stop reason: HOSPADM

## 2025-02-11 RX ORDER — INSULIN ASPART 100 [IU]/ML
0-10 INJECTION, SOLUTION INTRAVENOUS; SUBCUTANEOUS
Status: DISCONTINUED | OUTPATIENT
Start: 2025-02-11 | End: 2025-02-13 | Stop reason: HOSPADM

## 2025-02-11 RX ORDER — AMLODIPINE AND OLMESARTAN MEDOXOMIL 10; 40 MG/1; MG/1
1 TABLET ORAL DAILY
Status: DISCONTINUED | OUTPATIENT
Start: 2025-02-12 | End: 2025-02-11 | Stop reason: CLARIF

## 2025-02-11 RX ORDER — ONDANSETRON 8 MG/1
8 TABLET, ORALLY DISINTEGRATING ORAL EVERY 8 HOURS PRN
Status: DISCONTINUED | OUTPATIENT
Start: 2025-02-11 | End: 2025-02-13 | Stop reason: HOSPADM

## 2025-02-11 RX ORDER — METOPROLOL TARTRATE 50 MG/1
100 TABLET ORAL 2 TIMES DAILY
Status: DISCONTINUED | OUTPATIENT
Start: 2025-02-11 | End: 2025-02-13 | Stop reason: HOSPADM

## 2025-02-11 RX ORDER — INSULIN GLARGINE 100 [IU]/ML
10 INJECTION, SOLUTION SUBCUTANEOUS NIGHTLY
Status: DISCONTINUED | OUTPATIENT
Start: 2025-02-11 | End: 2025-02-13 | Stop reason: HOSPADM

## 2025-02-11 RX ORDER — SODIUM CHLORIDE 0.9 % (FLUSH) 0.9 %
10 SYRINGE (ML) INJECTION EVERY 12 HOURS PRN
Status: DISCONTINUED | OUTPATIENT
Start: 2025-02-11 | End: 2025-02-13 | Stop reason: HOSPADM

## 2025-02-11 RX ORDER — ROSUVASTATIN CALCIUM 10 MG/1
10 TABLET, COATED ORAL NIGHTLY
COMMUNITY
Start: 2024-12-20

## 2025-02-11 RX ORDER — MORPHINE SULFATE 2 MG/ML
2 INJECTION, SOLUTION INTRAMUSCULAR; INTRAVENOUS EVERY 4 HOURS PRN
Status: DISCONTINUED | OUTPATIENT
Start: 2025-02-11 | End: 2025-02-13 | Stop reason: HOSPADM

## 2025-02-11 RX ORDER — ACETAMINOPHEN 325 MG/1
650 TABLET ORAL EVERY 4 HOURS PRN
Status: DISCONTINUED | OUTPATIENT
Start: 2025-02-11 | End: 2025-02-13 | Stop reason: HOSPADM

## 2025-02-11 RX ORDER — OSELTAMIVIR PHOSPHATE 75 MG/1
75 CAPSULE ORAL
Status: DISCONTINUED | OUTPATIENT
Start: 2025-02-11 | End: 2025-02-11

## 2025-02-11 RX ORDER — IBUPROFEN 200 MG
24 TABLET ORAL
Status: DISCONTINUED | OUTPATIENT
Start: 2025-02-11 | End: 2025-02-13 | Stop reason: HOSPADM

## 2025-02-11 RX ADMIN — OSELTAMIVIR PHOSPHATE 30 MG: 30 CAPSULE ORAL at 08:02

## 2025-02-11 RX ADMIN — INSULIN ASPART 2 UNITS: 100 INJECTION, SOLUTION INTRAVENOUS; SUBCUTANEOUS at 08:02

## 2025-02-11 RX ADMIN — OSELTAMIVIR PHOSPHATE 75 MG: 75 CAPSULE ORAL at 01:02

## 2025-02-11 RX ADMIN — INSULIN GLARGINE 10 UNITS: 100 INJECTION, SOLUTION SUBCUTANEOUS at 08:02

## 2025-02-11 RX ADMIN — DEXAMETHASONE SODIUM PHOSPHATE 8 MG: 4 INJECTION, SOLUTION INTRA-ARTICULAR; INTRALESIONAL; INTRAMUSCULAR; INTRAVENOUS; SOFT TISSUE at 04:02

## 2025-02-11 RX ADMIN — METOPROLOL TARTRATE 100 MG: 50 TABLET, FILM COATED ORAL at 08:02

## 2025-02-11 RX ADMIN — ACETAMINOPHEN 650 MG: 325 TABLET ORAL at 08:02

## 2025-02-11 NOTE — SUBJECTIVE & OBJECTIVE
Past Medical History:   Diagnosis Date    Diabetes mellitus     High cholesterol     Hypertension     Insomnia        Past Surgical History:   Procedure Laterality Date    CERVICAL DISCECTOMY      KNEE ARTHROSCOPY      RHINOPLASTY         Review of patient's allergies indicates:   Allergen Reactions    Shellfish containing products      Mouth, lips swelling, facial swelling.    Sulfa (sulfonamide antibiotics) Itching    Sulfasalazine        Family History    None       Tobacco Use    Smoking status: Never    Smokeless tobacco: Never   Substance and Sexual Activity    Alcohol use: Yes    Drug use: No    Sexual activity: Yes     Partners: Female         Review of Systems   All other systems reviewed and are negative.    Objective:     Vital Signs (Most Recent):  Temp: 97.6 °F (36.4 °C) (02/11/25 1509)  Pulse: 96 (02/11/25 1509)  Resp: 20 (02/11/25 1509)  BP: (!) 141/68 (02/11/25 1509)  SpO2: 100 % (02/11/25 1509) Vital Signs (24h Range):  Temp:  [97.6 °F (36.4 °C)-98.5 °F (36.9 °C)] 97.6 °F (36.4 °C)  Pulse:  [90-99] 96  Resp:  [17-20] 20  SpO2:  [96 %-100 %] 100 %  BP: (124-152)/(67-77) 141/68     Weight: 101.6 kg (223 lb 15.8 oz)  Body mass index is 36.15 kg/m².    No intake or output data in the 24 hours ending 02/11/25 1533     Physical Exam  Vitals and nursing note reviewed.   Constitutional:       General: He is not in acute distress.  HENT:      Head: Normocephalic and atraumatic.   Eyes:      General: No scleral icterus.     Extraocular Movements: Extraocular movements intact.      Conjunctiva/sclera: Conjunctivae normal.      Pupils: Pupils are equal, round, and reactive to light.   Cardiovascular:      Rate and Rhythm: Normal rate and regular rhythm.      Pulses: Normal pulses.      Heart sounds: No murmur heard.  Pulmonary:      Effort: Pulmonary effort is normal. No respiratory distress.      Breath sounds: No wheezing, rhonchi or rales.   Abdominal:      General: Abdomen is flat. There is no distension.  Physical Therapy Daily Treatment     Visit Count: 2/7  Plan of Care Dates: Initial: 7/10/17 Through: 8/21/17  Insurance Information: 20 visits per year, auth needed  7 Visits approved From 07/11/17 to 08/25/17   Authorization # [id number]  Next Referring Provider Visit: pending  Referred by: Oxana Whitten MD  Medical Diagnosis (from order):    Acute neck pain [M54.2]  Acte bilateral thoracic back pain [M54.6]  Acute pain of left shoulder [M25.512]   Treatment Diagnosis: Neck Symptoms with Pain, Impaired Posture, Impaired Joint Mobility, Impaired Range of Motion, Impaired Motor Function/Muscle Performance, Headache and Impaired Mobility  Insurance: 1. [employer]  2. N/A  Date of Onset: 6/22/17  Diagnosis Precautions: none  Chart reviewed: Relevant co-morbidities, allergies, tests and medications: ibuprofen, ice     SUBJECTIVE   Got immediate relief after dry needling.  2-3 days of relief.  Some soreness afterwards for the rest of the day.  Current Pain: 2-3/10 neck and thoracic spine.  Functional Change: Nothing specifically reported today.  Going to the Chiropractor 2 days per week.  Ran 5k on Saturday-hurt afterwards.      OBJECTIVE     Treatment   Therapeutic Exercise:   Reviewed HEP:  Piriformis stretch: IR/Add and ER/Abd  Bridge with posterior pelvic tilt  SCM stretch  Abdominal brace with clamshell  Upper trap    Clamshell progression    Rows with orange theraband 2 x 10  Bilateral shoulder extension with orange theraband 2 x 10  Briefly discussed self suboccipital release with tennis ball    Manual Therapy:   With pt seated:  -STM to bilateral upper trap, levator, cervical paraspinals    Current Home Program (not performed this date except as noted above): Piriformis stretch: IR/Add and ER/Abd  Bridge with posterior pelvic tilt  SCM stretch  Abdominal brace with clamshell with progression as able  Upper trap    Added:  Rows with theraband (given green theraband for progression)  Bilateral shoulder       Palpations: Abdomen is soft.      Tenderness: There is no abdominal tenderness.   Musculoskeletal:      Cervical back: Normal range of motion and neck supple. No rigidity or tenderness.      Right lower leg: No edema.      Left lower leg: No edema.   Neurological:      General: No focal deficit present.      Mental Status: He is alert and oriented to person, place, and time. Mental status is at baseline.          Vents:       Lines/Drains/Airways       Peripheral Intravenous Line  Duration                  Peripheral IV - Single Lumen 02/11/25 0915 18 G Right Antecubital <1 day                    Significant Labs:    CBC/Anemia Profile:  Recent Labs   Lab 02/11/25  0916   WBC 5.36   HGB 11.6*   HCT 35.6*      MCV 88   RDW 13.3        Chemistries:  Recent Labs   Lab 02/11/25  0916      K 4.6      CO2 23   BUN 30*   CREATININE 1.5*   CALCIUM 8.8   ALBUMIN 3.9   PROT 7.6   BILITOT 0.7   ALKPHOS 78   ALT 23   AST 28       All pertinent labs within the past 24 hours have been reviewed.    Significant Imaging:   I have reviewed all pertinent imaging results/findings within the past 24 hours.   extension with theraband       ASSESSMENT   Good relief noted with dry needling, getting relief for 2-3 days.  Pt also receiving chiropractic care, attending 1 session so far.  Pt to get a CT scan per MD-not yet scheduled.  Pt reports and displays good compliance with her HEP.  Pt will benefit from further dry needling/STM, core, glute, and scap strengthening.  May need to discuss holding off on running if this continues to increase pain (pt plans to start training for another race).      Pain after treatment: Not formally rated.   Result of above outlined education: Verbalizes understanding and Demonstrates understanding    Goals:       See initial eval    PLAN   Continue dry needling, STM/rib mobilizations bilaterally, suboccipital release, progress scapular strengthening, core and glute strengthening    THERAPY DAILY BILLING   Primary Insurance: [employer]  Secondary Insurance: N/A    Evaluation Procedures:  No evaluation codes were used on this date of service    Timed Procedures:  Manual Therapy, 20 minutes  Therapeutic Exercise, 25 minutes    Untimed Procedures:  No untimed codes were used on this date of service    Total Treatment Time: 45 minutes

## 2025-02-11 NOTE — HPI
"Mr Maharaj is a 65 y/o WM with HTN, DM, HLD, and obesity who is currently admitted to the Hospitalist service after presenting to the [facility] ER with complaint of cough/congestion x 5 days.  ROS include F/C, myalgias, sorethroat.  He does recall a particularly bad coughing fit on Friday (5 days ago) that was accompanied by a sharp pain across his chest.  Denies SOB, though says he "can't breathe" due to the congestion.  Work-up in the ER included a CXR and subsequent CT Chest that shows a small left sided PTX.      Of note, he had a strikingly similar appearing CT in Feb 2020.  He doesn't recall much from that time, but his wife says he was in the hospital with PNA, but doesn't recall ever having been told about the PTX.  No chest imaging is available to me between these two periods of time.    He arrives on NRB to help with reabsorption (SpO2 was fine on room air).  Says he is feeling a bit better overall.  Denies any CP currently.  "

## 2025-02-11 NOTE — ED PROVIDER NOTES
Encounter Date: 2/11/2025       History     Chief Complaint   Patient presents with    Pneumo     Dx with flu A and a pneumothorax, told to come back to ED for further eval       The history is provided by the patient.   Cough  This is a new problem. The current episode started several days ago. The problem occurs constantly. The problem has been unchanged. The cough is Productive of sputum. Pertinent negatives include no chills and no shortness of breath.     Review of patient's allergies indicates:   Allergen Reactions    Shellfish containing products      Mouth, lips swelling, facial swelling.    Sulfa (sulfonamide antibiotics) Itching    Sulfasalazine      Past Medical History:   Diagnosis Date    Diabetes mellitus     High cholesterol     Hypertension     Insomnia      Past Surgical History:   Procedure Laterality Date    CERVICAL DISCECTOMY      KNEE ARTHROSCOPY      RHINOPLASTY       No family history on file.  Social History     Tobacco Use    Smoking status: Never    Smokeless tobacco: Never   Substance Use Topics    Alcohol use: Yes    Drug use: No     Review of Systems   Constitutional:  Negative for chills, fatigue and fever.   HENT:  Positive for congestion.    Respiratory:  Positive for cough. Negative for shortness of breath.    Gastrointestinal:  Negative for diarrhea, nausea and vomiting.   Genitourinary:  Negative for dysuria.   Neurological:  Negative for weakness and numbness.       Physical Exam     Initial Vitals [02/11/25 0733]   BP Pulse Resp Temp SpO2   (!) 141/72 97 20 98.3 °F (36.8 °C) 96 %      MAP       --         Physical Exam    Constitutional: He appears well-developed and well-nourished. No distress.   HENT:   Head: Normocephalic and atraumatic.   Eyes: Conjunctivae are normal. Pupils are equal, round, and reactive to light.   Neck: Neck supple.   Normal range of motion.  Cardiovascular:  Normal rate, regular rhythm and normal heart sounds.           Pulmonary/Chest: Breath sounds  normal.   Abdominal: Abdomen is soft. Bowel sounds are normal.   Musculoskeletal:         General: Normal range of motion.      Cervical back: Normal range of motion and neck supple.     Neurological: He is alert and oriented to person, place, and time. No cranial nerve deficit.   Skin: Skin is warm and dry.   Psychiatric: He has a normal mood and affect.         ED Course   Critical Care    Date/Time: 2/11/2025 10:06 AM    Performed by: Dg Mcgrath MD  Authorized by: Dg Mcgrath MD  Direct patient critical care time: 25 minutes  Additional history critical care time: 12 minutes  Ordering / reviewing critical care time: 12 minutes  Documentation critical care time: 11 minutes  Consulting other physicians critical care time: 5 minutes  Total critical care time (exclusive of procedural time) : 65 minutes  Critical care was necessary to treat or prevent imminent or life-threatening deterioration of the following conditions: respiratory failure.        Labs Reviewed   CBC W/ AUTO DIFFERENTIAL - Abnormal       Result Value    WBC 5.36      RBC 4.07 (*)     Hemoglobin 11.6 (*)     Hematocrit 35.6 (*)     MCV 88      MCH 28.5      MCHC 32.6      RDW 13.3      Platelets 205      MPV 10.0      Immature Granulocytes 0.4      Gran # (ANC) 4.8      Immature Grans (Abs) 0.02      Lymph # 0.4 (*)     Mono # 0.1 (*)     Eos # 0.0      Baso # 0.01      nRBC 0      Gran % 89.3 (*)     Lymph % 7.6 (*)     Mono % 2.1 (*)     Eosinophil % 0.4      Basophil % 0.2      Differential Method Automated     COMPREHENSIVE METABOLIC PANEL - Abnormal    Sodium 139      Potassium 4.6      Chloride 104      CO2 23      Glucose 204 (*)     BUN 30 (*)     Creatinine 1.5 (*)     Calcium 8.8      Total Protein 7.6      Albumin 3.9      Total Bilirubin 0.7      Alkaline Phosphatase 78      AST 28      ALT 23      eGFR 51.3 (*)     Anion Gap 12     HEPATITIS C ANTIBODY   HEP C VIRUS HOLD SPECIMEN   HIV 1 / 2 ANTIBODY          Imaging  Results              CT Chest Without Contrast (Final result)  Result time 02/11/25 09:10:08      Final result by Blue Gamino MD (02/11/25 09:10:08)                   Impression:      1.  This study corroborates the presence of a left pneumothorax.  The amount of air in the left pleural space is more, when compared to the prior chest CT scan.  Etiology is uncertain.    2.  Multiple bilateral pulmonary nodules measuring up to 5 mm noted.  For multiple solid nodules all <6 mm, Fleischner Society 2017 guidelines recommend no routine follow up for a low risk patient, or follow up with non-contrast chest CT at 12 months after discovery in a high risk patient.    3.  The ascending aorta measures 4.6 cm in diameter.  Aortic valve plane calcifications noted.  Findings are suggestive of aortic stenosis.  Clinical correlation is advised.  Further evaluation with an echocardiogram would be helpful.    4.  Marked anterior wedging of the T9 vertebral body with sclerosis has developed in the interim.  Adjacent vacuum phenomenon in the T9/T10 disc suggests this is a chronic process.    5.  Negative for acute process otherwise.  Numerous stable findings as noted above.    All CT scans at this facility are performed  using dose modulation techniques as appropriate to performed exam including the following:  automated exposure control; adjustment of mA and/or kV according to the patients size (this includes techniques or standardized protocols for targeted exams where dose is matched to indication/reason for exam: i.e. extremities or head);  iterative reconstruction technique.      Electronically signed by: Blue Gamino MD  Date:    02/11/2025  Time:    09:10               Narrative:    EXAMINATION:  CT CHEST WITHOUT CONTRAST, multiplanar reconstructions    CLINICAL HISTORY:  pneumothorax;    TECHNIQUE:  Axial images through the chest were obtained without the use of IV contrast. Sagittal and coronal <reconstructions are  provided for review>.    COMPARISON:  Chest x-ray performed earlier the same day, chest CT scan from February 3, 2020    FINDINGS:  This study corroborates the presence of a left pneumothorax.  When compared to the prior chest CT scan, this pneumothorax is slightly larger.  There is mild dependent atelectasis in the lung bases.  There is minimal scarring or atelectasis in the inferior lingula.  2 mm medial right upper lobe nodule.  There are multiple small nodules in the bilateral pulmonary nodules measuring up to 5 mm in the lingula, reference image 252 of series 4.  Negative for right pneumothorax.  Negative for effusions.    The ascending aorta measures 4.6 cm in diameter.  Aortic valve plane calcifications noted.  Moderate aortic calcifications without aneurysmal change.  Marked coronary artery calcifications.    Prominent superior pericardial recess again seen.  There are no hilar or mediastinal masses or abnormal lymph nodes by size criteria.    The airways are patent.  The thyroid gland is normal.  The esophagus is normal.    Stable vascular calcifications without aneurysmal change.  The upper abdominal organs are otherwise normal.    Interval development of marked anterior wedging of the T9 vertebral body with sclerosis present.  There is vacuum disc phenomenon involving the adjacent T9/T10 disc.  The osseous structures are otherwise unchanged.                                       Medications - No data to display  Medical Decision Making  DDx: pneumothorax, influenza    Amount and/or Complexity of Data Reviewed  Labs: ordered.  Radiology: ordered.  Discussion of management or test interpretation with external provider(s): Patient was here earlier and diagnosed with pneumonia. X-ray shows a small pneumothorax, thus patient was called to return.  CT obtained, which confirms the pneumothorax.  Consult  for admission.      Dr. Andrade will admit.    Risk  Decision regarding hospitalization.                                       Clinical Impression:  Final diagnoses:  [J93.11] Primary spontaneous pneumothorax (Primary)          ED Disposition Condition    Admit Stable                Dg Mcgrath MD  02/11/25 1002

## 2025-02-11 NOTE — ED PROVIDER NOTES
Encounter Date: 2/11/2025       History     Chief Complaint   Patient presents with    Cough     Cough x5 days. +nasal congestion     65 y/o M with PMH of DM, HLD, HTN here with c/o cough and congestion. This started 4 days ago with cough, congestion, sore throat, fever, chills, and myalgias. He is just having the cough and congestion now, which has kept him up all night. He does not feel like he can go to work today 2/2 to this. Denies any chest pain, SOB, numbness, weakness.     The history is provided by the patient.     Review of patient's allergies indicates:   Allergen Reactions    Shellfish containing products      Mouth, lips swelling, facial swelling.    Sulfa (sulfonamide antibiotics) Itching    Sulfasalazine      Past Medical History:   Diagnosis Date    Diabetes mellitus     High cholesterol     Hypertension     Insomnia      Past Surgical History:   Procedure Laterality Date    CERVICAL DISCECTOMY      KNEE ARTHROSCOPY      RHINOPLASTY       No family history on file.  Social History     Tobacco Use    Smoking status: Never    Smokeless tobacco: Never   Substance Use Topics    Alcohol use: Yes    Drug use: No     Review of Systems   Constitutional:  Negative for chills and fever.   HENT:  Positive for congestion. Negative for dental problem.    Eyes:  Negative for pain and visual disturbance.   Respiratory:  Positive for cough. Negative for shortness of breath.    Cardiovascular:  Negative for chest pain and palpitations.   Gastrointestinal:  Negative for abdominal pain, diarrhea, nausea and vomiting.   Genitourinary:  Negative for dysuria and flank pain.   Musculoskeletal:  Negative for back pain and neck pain.   Skin:  Negative for rash and wound.   Neurological:  Negative for weakness, numbness and headaches.       Physical Exam     Initial Vitals [02/11/25 0349]   BP Pulse Resp Temp SpO2   (!) 152/67 96 18 -- 97 %      MAP       --         Physical Exam    Constitutional: He appears well-developed  and well-nourished. No distress.   HENT:   Head: Normocephalic and atraumatic. Mouth/Throat: Oropharynx is clear and moist.   Eyes: EOM are normal. Pupils are equal, round, and reactive to light.   Neck: Neck supple.   Normal range of motion.  Cardiovascular:  Normal rate and regular rhythm.           Pulmonary/Chest: Breath sounds normal. No respiratory distress. He has no wheezes. He has no rales.   Abdominal: He exhibits no distension. There is no abdominal tenderness.   Musculoskeletal:         General: No tenderness. Normal range of motion.      Cervical back: Normal range of motion and neck supple.     Neurological: He is alert and oriented to person, place, and time. He has normal strength. No sensory deficit.   Skin: Skin is warm and dry.   Psychiatric: He has a normal mood and affect.         ED Course   Procedures  Labs Reviewed   POCT INFLUENZA A/B MOLECULAR - Abnormal       Result Value    POC Molecular Influenza A Ag Positive (*)     POC Molecular Influenza B Ag Negative       Acceptable Yes     SARS-COV-2 RDRP GENE    POC Rapid COVID Negative       Acceptable Yes            Imaging Results              X-Ray Chest AP Portable (Preliminary result)  Result time 02/11/25 04:30:07      Wet Read by Patrick Amato MD (02/11/25 04:30:07, [facility] - Emergency Dept, Emergency Medicine)    No acute findings. Similar to prior.                                      Medications   dexAMETHasone injection 8 mg (has no administration in time range)     Medical Decision Making  DDx includes URI, Viral syndrome, PNA, bronchitis    Amount and/or Complexity of Data Reviewed  Labs: ordered. Decision-making details documented in ED Course.  Radiology: ordered and independent interpretation performed. Decision-making details documented in ED Course.    Risk  Prescription drug management.               ED Course as of 02/11/25 0433   Tue Feb 11, 2025   0432 Patient telling me he has a history of  asthma, and feels like he needs steroids and albuterol.  [BA]   0432 4:32 AM Reassessment: I reassessed the pt.  The pt is resting comfortably and is NAD.  Pt states their sx have improved. Discussed test results, shared treatment plan, specific conditions for return, and the need for f/u.  Answered their questions at this time.  Pt understands and agrees to the plan.  The pt has remained hemodynamically stable through ED course and is stable for discharge.    [BA]      ED Course User Index  [BA] Patrick Amato MD                           Clinical Impression:  Final diagnoses:  [J10.1] Influenza A (Primary)  [R05.1] Acute cough          ED Disposition Condition    Discharge Stable          ED Prescriptions       Medication Sig Dispense Start Date End Date Auth. Provider    oseltamivir (TAMIFLU) 75 MG capsule Take 1 capsule (75 mg total) by mouth 2 (two) times daily. for 5 days 10 capsule 2/11/2025 2/16/2025 Patrick Amato MD    predniSONE (DELTASONE) 10 MG tablet Take 4 tablets (40 mg total) by mouth once daily. for 5 days 20 tablet 2/11/2025 2/16/2025 Patrick Amato MD    albuterol (PROVENTIL/VENTOLIN HFA) 90 mcg/actuation inhaler Inhale 1-2 puffs into the lungs every 6 (six) hours as needed for Wheezing. Rescue 8 g 2/11/2025 -- Patrick Amato MD          Follow-up Information       Follow up With Specialties Details Why Contact Info    Leon Mitchell MD Internal Medicine Schedule an appointment as soon as possible for a visit in 2 days For re-evaluation and further treatment [id number] [facility]  [place] [address]  [phone]      [facility] Emergency Dept Emergency Medicine Go today If symptoms worsen, For re-evaluation and further treatment, As needed [id number]  Emergency Department  [place] [id number] [phone]             Patrick Amato MD  02/11/25 3550

## 2025-02-11 NOTE — CONSULTS
"[facility] - Med Surg 3  Pulmonology  Consult Note    Patient Name: Rafael Maharaj  MRN: [id number]  Admission Date: 2/11/2025  Hospital Length of Stay: 0 days  Code Status: Prior  Attending Physician: Maribel Andrade MD  Primary Care Provider: Leon Mitchell MD   Principal Problem: Pneumothorax    Consults  Subjective:     HPI:  Mr Maharaj is a 63 y/o WM with HTN, DM, HLD, and obesity who is currently admitted to the Hospitalist service after presenting to the [facility] ER with complaint of cough/congestion x 5 days.  ROS include F/C, myalgias, sorethroat.  He does recall a particularly bad coughing fit on Friday (5 days ago) that was accompanied by a sharp pain across his chest.  Denies SOB, though says he "can't breathe" due to the congestion.  Work-up in the ER included a CXR and subsequent CT Chest that shows a small left sided PTX.      Of note, he had a strikingly similar appearing CT in Feb 2020.  He doesn't recall much from that time, but his wife says he was in the hospital with PNA, but doesn't recall ever having been told about the PTX.  No chest imaging is available to me between these two periods of time.    He arrives on NRB to help with reabsorption (SpO2 was fine on room air).  Says he is feeling a bit better overall.  Denies any CP currently.    Past Medical History:   Diagnosis Date    Diabetes mellitus     High cholesterol     Hypertension     Insomnia        Past Surgical History:   Procedure Laterality Date    CERVICAL DISCECTOMY      KNEE ARTHROSCOPY      RHINOPLASTY         Review of patient's allergies indicates:   Allergen Reactions    Shellfish containing products      Mouth, lips swelling, facial swelling.    Sulfa (sulfonamide antibiotics) Itching    Sulfasalazine        Family History    None       Tobacco Use    Smoking status: Never    Smokeless tobacco: Never   Substance and Sexual Activity    Alcohol use: Yes    Drug use: No    Sexual activity: Yes     Partners: Female         Review " of Systems   All other systems reviewed and are negative.    Objective:     Vital Signs (Most Recent):  Temp: 97.6 °F (36.4 °C) (02/11/25 1509)  Pulse: 96 (02/11/25 1509)  Resp: 20 (02/11/25 1509)  BP: (!) 141/68 (02/11/25 1509)  SpO2: 100 % (02/11/25 1509) Vital Signs (24h Range):  Temp:  [97.6 °F (36.4 °C)-98.5 °F (36.9 °C)] 97.6 °F (36.4 °C)  Pulse:  [90-99] 96  Resp:  [17-20] 20  SpO2:  [96 %-100 %] 100 %  BP: (124-152)/(67-77) 141/68     Weight: 101.6 kg (223 lb 15.8 oz)  Body mass index is 36.15 kg/m².    No intake or output data in the 24 hours ending 02/11/25 1533     Physical Exam  Vitals and nursing note reviewed.   Constitutional:       General: He is not in acute distress.  HENT:      Head: Normocephalic and atraumatic.   Eyes:      General: No scleral icterus.     Extraocular Movements: Extraocular movements intact.      Conjunctiva/sclera: Conjunctivae normal.      Pupils: Pupils are equal, round, and reactive to light.   Cardiovascular:      Rate and Rhythm: Normal rate and regular rhythm.      Pulses: Normal pulses.      Heart sounds: No murmur heard.  Pulmonary:      Effort: Pulmonary effort is normal. No respiratory distress.      Breath sounds: No wheezing, rhonchi or rales.   Abdominal:      General: Abdomen is flat. There is no distension.      Palpations: Abdomen is soft.      Tenderness: There is no abdominal tenderness.   Musculoskeletal:      Cervical back: Normal range of motion and neck supple. No rigidity or tenderness.      Right lower leg: No edema.      Left lower leg: No edema.   Neurological:      General: No focal deficit present.      Mental Status: He is alert and oriented to person, place, and time. Mental status is at baseline.          Vents:       Lines/Drains/Airways       Peripheral Intravenous Line  Duration                  Peripheral IV - Single Lumen 02/11/25 0915 18 G Right Antecubital <1 day                    Significant Labs:    CBC/Anemia Profile:  Recent Labs   Lab  "02/11/25  0916   WBC 5.36   HGB 11.6*   HCT 35.6*      MCV 88   RDW 13.3        Chemistries:  Recent Labs   Lab 02/11/25  0916      K 4.6      CO2 23   BUN 30*   CREATININE 1.5*   CALCIUM 8.8   ALBUMIN 3.9   PROT 7.6   BILITOT 0.7   ALKPHOS 78   ALT 23   AST 28       All pertinent labs within the past 24 hours have been reviewed.    Significant Imaging:   I have reviewed all pertinent imaging results/findings within the past 24 hours.    ABG  No results for input(s): "PH", "PO2", "PCO2", "HCO3", "BE" in the last 168 hours.  Assessment/Plan:     Pulmonary  * Pneumothorax  - Strikingly similar appearance to the PTX he had 5 years ago.  Hard to know if it has been there the whole time, but I doubt it.  If the space was there chronically, it would tend to fill with fluid and would appear as a pleural effusion as opposed to PTX.  - no clear blebs, but likely has an area of fragility that leaks when he really gets to coughing  - agree with NRB to help wit reabsorption  - will check a CXR now and then in the morning to ensure resolution/improvement  - could consider Thoracic surgery consult as this is presumably his 2nd episode, but I would probably hold off for now given the small nature of his PTX currently    ID  Influenza A  - continue Tamiflu          Thank you for your consult. I will follow-up with patient. Please contact us if you have any additional questions.     Usama Kelley MD  Pulmonology  [facility]    "

## 2025-02-11 NOTE — PROGRESS NOTES
Pharmacist Renal Dose Adjustment Note    Rafael Maharaj is a 64 y.o. male being treated with the medication oseltamivir.     Patient Data:    Vital Signs (Most Recent):  Temp: 97.6 °F (36.4 °C) (02/11/25 1509)  Pulse: 96 (02/11/25 1509)  Resp: 20 (02/11/25 1509)  BP: (!) 141/68 (02/11/25 1509)  SpO2: 100 % (02/11/25 1509) Vital Signs (72h Range):  Temp:  [97.6 °F (36.4 °C)-98.5 °F (36.9 °C)]   Pulse:  [90-99]   Resp:  [17-20]   BP: (124-152)/(67-77)   SpO2:  [96 %-100 %]      Recent Labs   Lab 02/11/25  0916   CREATININE 1.5*     Serum creatinine: 1.5 mg/dL (H) 02/11/25 0916  Estimated creatinine clearance: 54.8 mL/min (A)    Oseltamivir 75 mg oral twice daily will be changed to oseltamivir 75 mg oral once, then 30 mg oral twice daily per renal dose protocol for influenza treatment in CrCl 30-60 ml/min.     Thank you,  Pharmacist's Name: Jean Parisi

## 2025-02-11 NOTE — ASSESSMENT & PLAN NOTE
- Strikingly similar appearance to the PTX he had 5 years ago.  Hard to know if it has been there the whole time, but I doubt it.  If the space was there chronically, it would tend to fill with fluid and would appear as a pleural effusion as opposed to PTX.  - no clear blebs, but likely has an area of fragility that leaks when he really gets to coughing  - agree with NRB to help wit reabsorption  - will check a CXR now and then in the morning to ensure resolution/improvement  - could consider Thoracic surgery consult as this is presumably his 2nd episode, but I would probably hold off for now given the small nature of his PTX currently

## 2025-02-12 LAB
ALBUMIN SERPL BCP-MCNC: 3.6 G/DL (ref 3.5–5.2)
ALP SERPL-CCNC: 74 U/L (ref 40–150)
ALT SERPL W/O P-5'-P-CCNC: 20 U/L (ref 10–44)
ANION GAP SERPL CALC-SCNC: 10 MMOL/L (ref 8–16)
AST SERPL-CCNC: 23 U/L (ref 10–40)
BASOPHILS # BLD AUTO: 0.02 K/UL (ref 0–0.2)
BASOPHILS NFR BLD: 0.4 % (ref 0–1.9)
BILIRUB SERPL-MCNC: 0.4 MG/DL (ref 0.1–1)
BUN SERPL-MCNC: 34 MG/DL (ref 8–23)
CALCIUM SERPL-MCNC: 8.7 MG/DL (ref 8.7–10.5)
CHLORIDE SERPL-SCNC: 104 MMOL/L (ref 95–110)
CO2 SERPL-SCNC: 23 MMOL/L (ref 23–29)
CREAT SERPL-MCNC: 1.6 MG/DL (ref 0.5–1.4)
DIFFERENTIAL METHOD BLD: ABNORMAL
EOSINOPHIL # BLD AUTO: 0 K/UL (ref 0–0.5)
EOSINOPHIL NFR BLD: 0.2 % (ref 0–8)
ERYTHROCYTE [DISTWIDTH] IN BLOOD BY AUTOMATED COUNT: 13.3 % (ref 11.5–14.5)
EST. GFR  (NO RACE VARIABLE): 48 ML/MIN/1.73 M^2
GLUCOSE SERPL-MCNC: 150 MG/DL (ref 70–110)
GLUCOSE SERPL-MCNC: 154 MG/DL (ref 70–110)
GLUCOSE SERPL-MCNC: 175 MG/DL (ref 70–110)
GLUCOSE SERPL-MCNC: 313 MG/DL (ref 70–110)
HCT VFR BLD AUTO: 34.8 % (ref 40–54)
HGB BLD-MCNC: 10.8 G/DL (ref 14–18)
IMM GRANULOCYTES # BLD AUTO: 0.03 K/UL (ref 0–0.04)
IMM GRANULOCYTES NFR BLD AUTO: 0.5 % (ref 0–0.5)
LYMPHOCYTES # BLD AUTO: 1 K/UL (ref 1–4.8)
LYMPHOCYTES NFR BLD: 18.9 % (ref 18–48)
MCH RBC QN AUTO: 28.5 PG (ref 27–31)
MCHC RBC AUTO-ENTMCNC: 31 G/DL (ref 32–36)
MCV RBC AUTO: 92 FL (ref 82–98)
MONOCYTES # BLD AUTO: 0.6 K/UL (ref 0.3–1)
MONOCYTES NFR BLD: 11.6 % (ref 4–15)
NEUTROPHILS # BLD AUTO: 3.8 K/UL (ref 1.8–7.7)
NEUTROPHILS NFR BLD: 68.4 % (ref 38–73)
NRBC BLD-RTO: 0 /100 WBC
PLATELET # BLD AUTO: 223 K/UL (ref 150–450)
PMV BLD AUTO: 10.5 FL (ref 9.2–12.9)
POTASSIUM SERPL-SCNC: 4.2 MMOL/L (ref 3.5–5.1)
PROT SERPL-MCNC: 6.9 G/DL (ref 6–8.4)
RBC # BLD AUTO: 3.79 M/UL (ref 4.6–6.2)
SODIUM SERPL-SCNC: 137 MMOL/L (ref 136–145)
WBC # BLD AUTO: 5.51 K/UL (ref 3.9–12.7)

## 2025-02-12 PROCEDURE — 85025 COMPLETE CBC W/AUTO DIFF WBC: CPT | Performed by: FAMILY MEDICINE

## 2025-02-12 PROCEDURE — 94761 N-INVAS EAR/PLS OXIMETRY MLT: CPT

## 2025-02-12 PROCEDURE — 99900035 HC TECH TIME PER 15 MIN (STAT)

## 2025-02-12 PROCEDURE — 25000003 PHARM REV CODE 250: Performed by: FAMILY MEDICINE

## 2025-02-12 PROCEDURE — 27100171 HC OXYGEN HIGH FLOW UP TO 24 HOURS

## 2025-02-12 PROCEDURE — 36415 COLL VENOUS BLD VENIPUNCTURE: CPT | Performed by: FAMILY MEDICINE

## 2025-02-12 PROCEDURE — G0378 HOSPITAL OBSERVATION PER HR: HCPCS

## 2025-02-12 PROCEDURE — 27000207 HC ISOLATION

## 2025-02-12 PROCEDURE — 21400001 HC TELEMETRY ROOM

## 2025-02-12 PROCEDURE — 80053 COMPREHEN METABOLIC PANEL: CPT | Performed by: FAMILY MEDICINE

## 2025-02-12 PROCEDURE — 96372 THER/PROPH/DIAG INJ SC/IM: CPT | Performed by: FAMILY MEDICINE

## 2025-02-12 PROCEDURE — 63600175 PHARM REV CODE 636 W HCPCS: Performed by: FAMILY MEDICINE

## 2025-02-12 RX ADMIN — OSELTAMIVIR PHOSPHATE 30 MG: 30 CAPSULE ORAL at 08:02

## 2025-02-12 RX ADMIN — PREDNISONE 40 MG: 20 TABLET ORAL at 08:02

## 2025-02-12 RX ADMIN — ESCITALOPRAM OXALATE 20 MG: 5 TABLET, FILM COATED ORAL at 08:02

## 2025-02-12 RX ADMIN — AMLODIPINE BESYLATE 10 MG: 10 TABLET ORAL at 08:02

## 2025-02-12 RX ADMIN — INDAPAMIDE 2.5 MG: 1.25 TABLET, FILM COATED ORAL at 08:02

## 2025-02-12 RX ADMIN — METOPROLOL TARTRATE 100 MG: 50 TABLET, FILM COATED ORAL at 08:02

## 2025-02-12 RX ADMIN — INSULIN ASPART 8 UNITS: 100 INJECTION, SOLUTION INTRAVENOUS; SUBCUTANEOUS at 05:02

## 2025-02-12 RX ADMIN — INSULIN GLARGINE 10 UNITS: 100 INJECTION, SOLUTION SUBCUTANEOUS at 08:02

## 2025-02-12 RX ADMIN — LOSARTAN POTASSIUM 100 MG: 50 TABLET, FILM COATED ORAL at 08:02

## 2025-02-12 RX ADMIN — INSULIN ASPART 3 UNITS: 100 INJECTION, SOLUTION INTRAVENOUS; SUBCUTANEOUS at 08:02

## 2025-02-12 NOTE — PROGRESS NOTES
[facility] Med [facility] 3  Pulmonology  Progress Note    Patient Name: Rafael Maharaj  MRN: [id number]  Admission Date: 2/11/2025  Hospital Length of Stay: 1 days  Code Status: Full Code  Attending Provider: Danielito Stahl MD  Primary Care Provider: Leon Mitchell MD   Principal Problem: Pneumothorax    Subjective:     Interval History: No acute events.  CXR largely stable this morning.  Still with some left sided pleuritic chest pain.   Denies SOB.      Objective:     Vital Signs (Most Recent):  Temp: 98.1 °F (36.7 °C) (02/12/25 0808)  Pulse: 65 (02/12/25 0808)  Resp: 18 (02/12/25 0808)  BP: 133/62 (02/12/25 0808)  SpO2: 100 % (02/12/25 0808) Vital Signs (24h Range):  Temp:  [97.5 °F (36.4 °C)-98.5 °F (36.9 °C)] 98.1 °F (36.7 °C)  Pulse:  [58-99] 65  Resp:  [17-20] 18  SpO2:  [99 %-100 %] 100 %  BP: (124-147)/(62-77) 133/62     Weight: 101.6 kg (223 lb 15.8 oz)  Body mass index is 36.15 kg/m².    No intake or output data in the 24 hours ending 02/12/25 1016     Physical Exam  Vitals and nursing note reviewed.   Constitutional:       General: He is not in acute distress.  Cardiovascular:      Rate and Rhythm: Normal rate and regular rhythm.      Pulses: Normal pulses.      Heart sounds: No murmur heard.  Pulmonary:      Effort: Pulmonary effort is normal. No respiratory distress.      Breath sounds: No wheezing, rhonchi or rales.   Abdominal:      General: Abdomen is flat. There is no distension.      Palpations: Abdomen is soft.      Tenderness: There is no abdominal tenderness.   Musculoskeletal:      Right lower leg: No edema.      Left lower leg: No edema.   Neurological:      General: No focal deficit present.      Mental Status: He is alert and oriented to person, place, and time. Mental status is at baseline.           Review of Systems    Vents:       Lines/Drains/Airways       Peripheral Intravenous Line  Duration                  Peripheral IV - Single Lumen 02/11/25 0915 18 G Right Antecubital 1 day          "           Significant Labs:    CBC/Anemia Profile:  Recent Labs   Lab 02/11/25  0916 02/12/25  0542   WBC 5.36 5.51   HGB 11.6* 10.8*   HCT 35.6* 34.8*    223   MCV 88 92   RDW 13.3 13.3        Chemistries:  Recent Labs   Lab 02/11/25  0916 02/12/25  0542    137   K 4.6 4.2    104   CO2 23 23   BUN 30* 34*   CREATININE 1.5* 1.6*   CALCIUM 8.8 8.7   ALBUMIN 3.9 3.6   PROT 7.6 6.9   BILITOT 0.7 0.4   ALKPHOS 78 74   ALT 23 20   AST 28 23       All pertinent labs within the past 24 hours have been reviewed.    Significant Imaging:  I have reviewed all pertinent imaging results/findings within the past 24 hours.    ABG  No results for input(s): "PH", "PO2", "PCO2", "HCO3", "BE" in the last 168 hours.  Assessment/Plan:     Pulmonary  * Pneumothorax  - Strikingly similar appearance to the PTX he had 5 years ago.  Hard to know if it has been there the whole time, but I doubt it.  If the space was there chronically, it would tend to fill with fluid and would appear as a pleural effusion as opposed to PTX.  - no clear blebs, but likely has an area of fragility that leaks when he really gets to coughing  - agree with NRB to help wit reabsorption  - will check a CXR now and then in the morning to ensure resolution/improvement  - could consider Thoracic surgery consult as this is presumably his 2nd episode, but I would probably hold off for now given the small nature of his PTX currently    2/12 - Still too small for chest tube.  Would observe for another day.  If stable, may be able to discharge tomorrow with close follow-up.    ID  Influenza A  - continue Tamiflu           Usama Kelley MD  Pulmonology  [facility]  "

## 2025-02-12 NOTE — HOSPITAL COURSE
Patient admitted with a spontaneous pneumothorax and flu positive.  He had repeated chest x-rays that shows stable small left-sided pneumothorax.  Patient was placed on a non-rebreather for resolution of the problem, but patient did not have any significant shortness a breath.  He did complain of coughing throughout the hospitalization and stated he had some sharp chest pain with the coughing but no other complaints.  On 2/13 (day of discharge), CXR showed almost complete resolution of the pneumothorax.  His coughing has improved.  Patient has a script for Tamiflu at home and has been counseled on completing a total of 5 days. Case dw Dr. Kelley, plan for follow up with SONYA Fuentes (pulmonary) on Monday for repeat CXR on 2/17 @4163 with follow up appt with Evelia.  Patient stable for discharge at this time.  All questions answered from patient and wife at bedside.

## 2025-02-12 NOTE — ASSESSMENT & PLAN NOTE
-pulmonary consulted   -continue non-rebreather   -chest x-ray on 02/12/2025-no change  -repeat chest x-ray in a.m.

## 2025-02-12 NOTE — H&P
[facility] 3  [facility] Medicine  History & Physical    Patient Name: Rafael Maharaj  MRN: [id number]  Patient Class: IP- Inpatient  Admission Date: 2/11/2025  Attending Physician: Danielito Stahl MD   Primary Care Provider: Leon Mitchell MD         Patient information was obtained from patient, spouse/SO, and ER records.     Subjective:     Principal Problem:Pneumothorax    Chief Complaint:   Chief Complaint   Patient presents with    Pneumo     Dx with flu A and a pneumothorax, told to come back to ED for further eval          HPI: Mr. Maharaj is a 64-year-old male with past medical history of diabetes, hypertension, and hyperlipidemia presented to [facility] with coughing and shortness a breath.  Upon arrival he was found to have a pneumothorax on chest x-ray as well as flu positive.  Patient was started on a non-rebreather and sent to [facility].  Patient was seen by Pulmonary who recommended just close monitoring and repeat chest x-ray in the morning.  Patient has not been hypoxic.  He has been complaining of chest pain with deep breath.  Hospital medicine will admit for observation.    Past Medical History:   Diagnosis Date    Diabetes mellitus     High cholesterol     Hypertension     Insomnia        Past Surgical History:   Procedure Laterality Date    CERVICAL DISCECTOMY      KNEE ARTHROSCOPY      RHINOPLASTY         Review of patient's allergies indicates:   Allergen Reactions    Shellfish containing products      Mouth, lips swelling, facial swelling.    Sulfa (sulfonamide antibiotics) Itching    Sulfasalazine        Current Facility-Administered Medications on File Prior to Encounter   Medication    [COMPLETED] dexAMETHasone injection 8 mg     Current Outpatient Medications on File Prior to Encounter   Medication Sig    amlodipine-olmesartan (IAN) 10-40 mg per tablet Take 1 tablet by mouth once daily.    aspirin (ECOTRIN) 81 MG EC tablet Take 81 mg by mouth once daily.    escitalopram  oxalate (LEXAPRO) 20 MG tablet Take 1 tablet by mouth once daily.    insulin glargine (LANTUS SOLOSTAR) 100 unit/mL (3 mL) InPn pen Inject into the skin every evening. Start with 20 units at bedtime then add 1 unit every day until blood sugar is less than 140    metFORMIN (GLUCOPHAGE-XR) 750 MG 24 hr tablet Take 750 mg by mouth 2 (two) times daily.    [START ON 2/14/2025] MOUNJARO 5 mg/0.5 mL PnIj Inject 5 mg into the skin every 7 days.    nebivolol (BYSTOLIC) 20 mg Tab Take by mouth every evening.    olmesartan (BENICAR) 40 MG tablet Take 1 tablet by mouth every morning.    oseltamivir (TAMIFLU) 75 MG capsule Take 1 capsule (75 mg total) by mouth 2 (two) times daily. for 5 days    pioglitazone-metformin (ACTOPLUS MET)  mg per tablet Take 1 tablet by mouth 2 (two) times daily.    predniSONE (DELTASONE) 10 MG tablet Take 4 tablets (40 mg total) by mouth once daily. for 5 days    rosuvastatin (CRESTOR) 10 MG tablet Take 10 mg by mouth every evening.    albuterol (PROVENTIL/VENTOLIN HFA) 90 mcg/actuation inhaler Inhale 1-2 puffs into the lungs every 6 (six) hours as needed for Wheezing. Rescue    indapamide (LOZOL) 2.5 MG Tab Take 2.5 mg by mouth.    valACYclovir (VALTREX) 1000 MG tablet Take 1 tablet (1,000 mg total) by mouth 3 (three) times daily. for 7 days    [DISCONTINUED] amLODIPine (NORVASC) 10 MG tablet Take 1 tablet by mouth every morning.    [DISCONTINUED] atorvastatin (LIPITOR) 40 MG tablet Take 40 mg by mouth once daily.    [DISCONTINUED] oxyCODONE-acetaminophen (PERCOCET) 5-325 mg per tablet Take 1 tablet by mouth every 6 (six) hours as needed for Pain.    [DISCONTINUED] triazolam (HALCION) 0.25 MG Tab Take 0.125 mg by mouth nightly as needed.     Family History    None       Tobacco Use    Smoking status: Never    Smokeless tobacco: Never   Substance and Sexual Activity    Alcohol use: Yes    Drug use: No    Sexual activity: Yes     Partners: Female     Review of Systems   Constitutional:  Negative  for activity change, appetite change, chills, fatigue and fever.   Respiratory:  Positive for cough and shortness of breath. Negative for apnea and stridor.    Cardiovascular:  Positive for chest pain. Negative for palpitations and leg swelling.   Gastrointestinal:  Negative for abdominal distention, constipation, diarrhea, nausea and vomiting.   Genitourinary:  Negative for difficulty urinating, dysuria, frequency, hematuria and urgency.   Musculoskeletal:  Negative for arthralgias, back pain, gait problem and joint swelling.   Neurological:  Negative for dizziness, seizures, weakness, light-headedness, numbness and headaches.   Psychiatric/Behavioral:  Negative for agitation, behavioral problems, confusion, decreased concentration, dysphoric mood and hallucinations.    All other systems reviewed and are negative.    Objective:     Vital Signs (Most Recent):  Temp: 97.6 °F (36.4 °C) (02/11/25 1509)  Pulse: 96 (02/11/25 1509)  Resp: 20 (02/11/25 1509)  BP: (!) 141/68 (02/11/25 1509)  SpO2: 100 % (02/11/25 1509) Vital Signs (24h Range):  Temp:  [97.6 °F (36.4 °C)-98.5 °F (36.9 °C)] 97.6 °F (36.4 °C)  Pulse:  [90-99] 96  Resp:  [17-20] 20  SpO2:  [96 %-100 %] 100 %  BP: (124-152)/(67-77) 141/68     Weight: 101.6 kg (223 lb 15.8 oz)  Body mass index is 36.15 kg/m².     Physical Exam  Vitals and nursing note reviewed.   Constitutional:       Appearance: Normal appearance.   HENT:      Head: Normocephalic and atraumatic.      Nose: Nose normal.      Mouth/Throat:      Mouth: Mucous membranes are moist.   Eyes:      Extraocular Movements: Extraocular movements intact.      Conjunctiva/sclera: Conjunctivae normal.   Cardiovascular:      Rate and Rhythm: Normal rate and regular rhythm.      Pulses: Normal pulses.      Heart sounds: Normal heart sounds.   Pulmonary:      Breath sounds: Normal breath sounds.   Abdominal:      General: Abdomen is flat. Bowel sounds are normal.      Palpations: Abdomen is soft.    Musculoskeletal:      Cervical back: Normal range of motion and neck supple.   Skin:     General: Skin is warm.      Capillary Refill: Capillary refill takes less than 2 seconds.   Neurological:      Mental Status: He is alert. Mental status is at baseline.   Psychiatric:         Mood and Affect: Mood normal.         Behavior: Behavior normal.                Significant Labs: All pertinent labs within the past 24 hours have been reviewed.  Recent Lab Results         02/11/25  1816   02/11/25  0916   02/11/25  0410   02/11/25  0409        POC Molecular Influenza A Ag     Positive         POC Molecular Influenza B Ag     Negative         Albumin   3.9           ALP   78           ALT   23           Anion Gap   12           AST   28           Baso #   0.01           Basophil %   0.2           BILIRUBIN TOTAL   0.7  Comment: For infants and newborns, interpretation of results should be based  on gestational age, weight and in agreement with clinical  observations.    Premature Infant recommended reference ranges:  Up to 24 hours.............<8.0 mg/dL  Up to 48 hours............<12.0 mg/dL  3-5 days..................<15.0 mg/dL  6-29 days.................<15.0 mg/dL             BUN   30           Calcium   8.8           Chloride   104           CO2   23           Creatinine   1.5           Differential Method   Automated           eGFR   51.3           Eos #   0.0           Eos %   0.4           Glucose   204           Gran # (ANC)   4.8           Gran %   89.3           Hematocrit   35.6           Hemoglobin   11.6           Immature Grans (Abs)   0.02  Comment: Mild elevation in immature granulocytes is non specific and   can be seen in a variety of conditions including stress response,   acute inflammation, trauma and pregnancy. Correlation with other   laboratory and clinical findings is essential.             Immature Granulocytes   0.4           Lymph #   0.4           Lymph %   7.6           MCH   28.5            MCHC   32.6           MCV   88           Mono #   0.1           Mono %   2.1           MPV   10.0           nRBC   0           Platelet Count   205           POCT Glucose 168             Potassium   4.6           PROTEIN TOTAL   7.6            Acceptable     Yes   Yes       RBC   4.07           RDW   13.3           SARS-CoV-2 RNA, Amplification, Qual       Negative       Sodium   139           WBC   5.36                   Significant Imaging:   X-Ray Chest 1 View   Final Result   Abnormal      As above.      This report was flagged in Epic as abnormal.         Electronically signed by: Didier Castillo   Date:    02/11/2025   Time:    17:31      CT Chest Without Contrast   Final Result      1.  This study corroborates the presence of a left pneumothorax.  The amount of air in the left pleural space is more, when compared to the prior chest CT scan.  Etiology is uncertain.      2.  Multiple bilateral pulmonary nodules measuring up to 5 mm noted.  For multiple solid nodules all <6 mm, Fleischner Society 2017 guidelines recommend no routine follow up for a low risk patient, or follow up with non-contrast chest CT at 12 months after discovery in a high risk patient.      3.  The ascending aorta measures 4.6 cm in diameter.  Aortic valve plane calcifications noted.  Findings are suggestive of aortic stenosis.  Clinical correlation is advised.  Further evaluation with an echocardiogram would be helpful.      4.  Marked anterior wedging of the T9 vertebral body with sclerosis has developed in the interim.  Adjacent vacuum phenomenon in the T9/T10 disc suggests this is a chronic process.      5.  Negative for acute process otherwise.  Numerous stable findings as noted above.      All CT scans at this facility are performed  using dose modulation techniques as appropriate to performed exam including the following:  automated exposure control; adjustment of mA and/or kV according to the patients size (this  includes techniques or standardized protocols for targeted exams where dose is matched to indication/reason for exam: i.e. extremities or head);  iterative reconstruction technique.         Electronically signed by: Blue Gamino MD   Date:    02/11/2025   Time:    09:10         Assessment/Plan:     * Pneumothorax  -pulmonary consulted   -continue non-rebreather   -repeat chest x-ray in a.m.      Influenza A  -continue Tamiflu x5 days      Type 2 diabetes mellitus, with long-term current use of insulin  -SSI and Accu-Cheks.      Essential hypertension  Patient's blood pressure range in the last 24 hours was: BP  Min: 124/77  Max: 152/67.The patient's inpatient anti-hypertensive regimen is listed below:  Current Antihypertensives  , Every morning, Oral  , , Oral  indapamide tablet 2.5 mg, Daily, Oral  metoprolol tartrate (LOPRESSOR) tablet 100 mg, 2 times daily, Oral  amLODIPine tablet 10 mg, Daily, Oral  losartan tablet 100 mg, Daily, Oral    Plan  - BP is controlled, no changes needed to their regimen        VTE Risk Mitigation (From admission, onward)           Ordered     Reason for No Pharmacological VTE Prophylaxis  Once        Comments: PTX, with possible need for intervention if lack of resolution.   Question:  Reasons:  Answer:  Physician Provided (leave comment)    02/11/25 1551     IP VTE HIGH RISK PATIENT  Once         02/11/25 1551     Place sequential compression device  Until discontinued         02/11/25 1551                               Pharmacist Renal Dose Adjustment Note    Rafael Maharaj is a 64 y.o. male being treated with the medication oseltamivir.     Patient Data:    Vital Signs (Most Recent):  Temp: 97.6 °F (36.4 °C) (02/11/25 1509)  Pulse: 96 (02/11/25 1509)  Resp: 20 (02/11/25 1509)  BP: (!) 141/68 (02/11/25 1509)  SpO2: 100 % (02/11/25 1509) Vital Signs (72h Range):  Temp:  [97.6 °F (36.4 °C)-98.5 °F (36.9 °C)]   Pulse:  [90-99]   Resp:  [17-20]   BP: (124-152)/(67-77)   SpO2:  [96 %-100  %]      Recent Labs   Lab 02/11/25  0916   CREATININE 1.5*     Serum creatinine: 1.5 mg/dL (H) 02/11/25 0916  Estimated creatinine clearance: 54.8 mL/min (A)    Oseltamivir 75 mg oral twice daily will be changed to oseltamivir 75 mg oral once, then 30 mg oral twice daily per renal dose protocol for influenza treatment in CrCl 30-60 ml/min.     Thank you,  Pharmacist's Name: Jean Stahl MD  Department of Hospital Medicine  [facility]

## 2025-02-12 NOTE — ASSESSMENT & PLAN NOTE
Patient's blood pressure range in the last 24 hours was: BP  Min: 127/67  Max: 147/76.The patient's inpatient anti-hypertensive regimen is listed below:  Current Antihypertensives  , Every morning, Oral  , , Oral  indapamide tablet 2.5 mg, Daily, Oral  metoprolol tartrate (LOPRESSOR) tablet 100 mg, 2 times daily, Oral  amLODIPine tablet 10 mg, Daily, Oral  losartan tablet 100 mg, Daily, Oral    Plan  - BP is controlled, no changes needed to their regimen

## 2025-02-12 NOTE — HPI
Mr. Maharaj is a 64-year-old male with past medical history of diabetes, hypertension, and hyperlipidemia presented to [facility] with coughing and shortness a breath.  Upon arrival he was found to have a pneumothorax on chest x-ray as well as flu positive.  Patient was started on a non-rebreather and sent to [facility].  Patient was seen by Pulmonary who recommended just close monitoring and repeat chest x-ray in the morning.  Patient has not been hypoxic.  He has been complaining of chest pain with deep breath.  Hospital medicine will admit for observation.

## 2025-02-12 NOTE — ASSESSMENT & PLAN NOTE
- Strikingly similar appearance to the PTX he had 5 years ago.  Hard to know if it has been there the whole time, but I doubt it.  If the space was there chronically, it would tend to fill with fluid and would appear as a pleural effusion as opposed to PTX.  - no clear blebs, but likely has an area of fragility that leaks when he really gets to coughing  - agree with NRB to help wit reabsorption  - will check a CXR now and then in the morning to ensure resolution/improvement  - could consider Thoracic surgery consult as this is presumably his 2nd episode, but I would probably hold off for now given the small nature of his PTX currently    2/12 - Still too small for chest tube.  Would observe for another day.  If stable, may be able to discharge tomorrow with close follow-up.

## 2025-02-12 NOTE — PROGRESS NOTES
[facility]  Hospital Medicine  Progress Note    Patient Name: Rafael Maharaj  MRN: [id number]  Patient Class: IP- Inpatient   Admission Date: 2/11/2025  Length of Stay: 1 days  Attending Physician: Danielito Stahl MD  Primary Care Provider: Leon Mitchell MD        Subjective     Principal Problem:Pneumothorax        HPI:  Mr. Maharaj is a 64-year-old male with past medical history of diabetes, hypertension, and hyperlipidemia presented to [facility] with coughing and shortness a breath.  Upon arrival he was found to have a pneumothorax on chest x-ray as well as flu positive.  Patient was started on a non-rebreather and sent to [facility].  Patient was seen by Pulmonary who recommended just close monitoring and repeat chest x-ray in the morning.  Patient has not been hypoxic.  He has been complaining of chest pain with deep breath.  Hospital medicine will admit for observation.    Overview/Hospital Course:  Patient admitted with a spontaneous pneumothorax and flu positive.  He had repeated chest x-rays that shows stable small left-sided pneumothorax.  Patient was placed on a non-rebreather for resolution of the problem, but patient did not have any significant shortness a breath.  He did complain of coughing throughout the hospitalization and stated he had some sharp chest pain with the coughing but no other complaints.    Interval History:  Follow up left pneumothorax.  Chest x-ray repeated this morning showed unchanged findings.  We will plan for repeat chest x-ray in the a.m. and improvement or stability noted then plan for discharge.    Review of Systems  Objective:     Vital Signs (Most Recent):  Temp: 97.8 °F (36.6 °C) (02/12/25 1209)  Pulse: 73 (02/12/25 1209)  Resp: 18 (02/12/25 1209)  BP: 131/71 (02/12/25 1209)  SpO2: 100 % (02/12/25 1209) Vital Signs (24h Range):  Temp:  [97.5 °F (36.4 °C)-98.5 °F (36.9 °C)] 97.8 °F (36.6 °C)  Pulse:  [58-99] 73  Resp:  [18-20] 18  SpO2:  [99 %-100 %] 100  %  BP: (127-147)/(62-76) 131/71     Weight: 101.6 kg (223 lb 15.8 oz)  Body mass index is 36.15 kg/m².  No intake or output data in the 24 hours ending 02/12/25 1254      Physical Exam  Vitals and nursing note reviewed.   Constitutional:       Appearance: Normal appearance.   HENT:      Head: Normocephalic and atraumatic.      Nose: Nose normal.      Mouth/Throat:      Mouth: Mucous membranes are moist.   Eyes:      Extraocular Movements: Extraocular movements intact.      Conjunctiva/sclera: Conjunctivae normal.   Cardiovascular:      Rate and Rhythm: Normal rate and regular rhythm.      Pulses: Normal pulses.      Heart sounds: Normal heart sounds.   Pulmonary:      Effort: Pulmonary effort is normal.      Breath sounds: Normal breath sounds.   Abdominal:      General: Abdomen is flat. Bowel sounds are normal.      Palpations: Abdomen is soft.   Musculoskeletal:         General: Normal range of motion.      Cervical back: Normal range of motion and neck supple.   Skin:     General: Skin is warm.      Capillary Refill: Capillary refill takes less than 2 seconds.   Neurological:      Mental Status: He is alert and oriented to person, place, and time. Mental status is at baseline.   Psychiatric:         Mood and Affect: Mood normal.         Behavior: Behavior normal.             Significant Labs: All pertinent labs within the past 24 hours have been reviewed.  Recent Lab Results  (Last 5 results in the past 24 hours)        02/12/25  1116   02/12/25  0753   02/12/25  0542   02/11/25 2015 02/11/25  1816        Albumin     3.6           ALP     74           ALT     20           Anion Gap     10           AST     23           Baso #     0.02           Basophil %     0.4           BILIRUBIN TOTAL     0.4  Comment: For infants and newborns, interpretation of results should be based  on gestational age, weight and in agreement with clinical  observations.    Premature Infant recommended reference ranges:  Up to 24  hours.............<8.0 mg/dL  Up to 48 hours............<12.0 mg/dL  3-5 days..................<15.0 mg/dL  6-29 days.................<15.0 mg/dL             BUN     34           Calcium     8.7           Chloride     104           CO2     23           Creatinine     1.6           Differential Method     Automated           eGFR     48           Eos #     0.0           Eos %     0.2           Glucose     154           Gran # (ANC)     3.8           Gran %     68.4           Hematocrit     34.8           Hemoglobin     10.8           Immature Grans (Abs)     0.03  Comment: Mild elevation in immature granulocytes is non specific and   can be seen in a variety of conditions including stress response,   acute inflammation, trauma and pregnancy. Correlation with other   laboratory and clinical findings is essential.             Immature Granulocytes     0.5           Lymph #     1.0           Lymph %     18.9           MCH     28.5           MCHC     31.0           MCV     92           Mono #     0.6           Mono %     11.6           MPV     10.5           nRBC     0           Platelet Count     223           POC Glucose 175   150             POCT Glucose       213   168       Potassium     4.2           PROTEIN TOTAL     6.9           RBC     3.79           RDW     13.3           Sodium     137           WBC     5.51                                  Significant Imaging:   X-Ray Chest AP Portable   Final Result      Small left pneumothorax, similar to previous.         Electronically signed by: Rui Orozco   Date:    02/12/2025   Time:    09:13      X-Ray Chest 1 View   Final Result      1.  Overall, the size of the pneumothorax has not significantly changed, with less air in the lung apex but more air in the lateral lower left lung.      2.  Continued follow-up radiographs recommended.         Electronically signed by: Blue Gamino MD   Date:    02/12/2025   Time:    06:21      X-Ray Chest 1 View   Final Result    Abnormal      As above.      This report was flagged in Epic as abnormal.         Electronically signed by: Didier Castillo   Date:    02/11/2025   Time:    17:31      CT Chest Without Contrast   Final Result      1.  This study corroborates the presence of a left pneumothorax.  The amount of air in the left pleural space is more, when compared to the prior chest CT scan.  Etiology is uncertain.      2.  Multiple bilateral pulmonary nodules measuring up to 5 mm noted.  For multiple solid nodules all <6 mm, Fleischner Society 2017 guidelines recommend no routine follow up for a low risk patient, or follow up with non-contrast chest CT at 12 months after discovery in a high risk patient.      3.  The ascending aorta measures 4.6 cm in diameter.  Aortic valve plane calcifications noted.  Findings are suggestive of aortic stenosis.  Clinical correlation is advised.  Further evaluation with an echocardiogram would be helpful.      4.  Marked anterior wedging of the T9 vertebral body with sclerosis has developed in the interim.  Adjacent vacuum phenomenon in the T9/T10 disc suggests this is a chronic process.      5.  Negative for acute process otherwise.  Numerous stable findings as noted above.      All CT scans at this facility are performed  using dose modulation techniques as appropriate to performed exam including the following:  automated exposure control; adjustment of mA and/or kV according to the patients size (this includes techniques or standardized protocols for targeted exams where dose is matched to indication/reason for exam: i.e. extremities or head);  iterative reconstruction technique.         Electronically signed by: Blue Gamino MD   Date:    02/11/2025   Time:    09:10           Assessment and Plan     * Pneumothorax  -pulmonary consulted   -continue non-rebreather   -chest x-ray on 02/12/2025-no change  -repeat chest x-ray in a.m.      Influenza A  -continue Tamiflu x5 days (D 2/5)      Type 2  diabetes mellitus, with long-term current use of insulin  -SSI and Accu-Cheks.      Essential hypertension  Patient's blood pressure range in the last 24 hours was: BP  Min: 127/67  Max: 147/76.The patient's inpatient anti-hypertensive regimen is listed below:  Current Antihypertensives  , Every morning, Oral  , , Oral  indapamide tablet 2.5 mg, Daily, Oral  metoprolol tartrate (LOPRESSOR) tablet 100 mg, 2 times daily, Oral  amLODIPine tablet 10 mg, Daily, Oral  losartan tablet 100 mg, Daily, Oral    Plan  - BP is controlled, no changes needed to their regimen        VTE Risk Mitigation (From admission, onward)           Ordered     Reason for No Pharmacological VTE Prophylaxis  Once        Comments: PTX, with possible need for intervention if lack of resolution.   Question:  Reasons:  Answer:  Physician Provided (leave comment)    02/11/25 1551     IP VTE HIGH RISK PATIENT  Once         02/11/25 1551     Place sequential compression device  Until discontinued         02/11/25 1551                    Discharge Planning   MATT:      Code Status: Full Code   Medical Readiness for Discharge Date:   Discharge Plan A: Home                        Danielito Stahl MD  Department of Hospital Medicine   [facility]

## 2025-02-12 NOTE — SUBJECTIVE & OBJECTIVE
Interval History: No acute events.  CXR largely stable this morning.  Still with some left sided pleuritic chest pain.   Denies SOB.      Objective:     Vital Signs (Most Recent):  Temp: 98.1 °F (36.7 °C) (02/12/25 0808)  Pulse: 65 (02/12/25 0808)  Resp: 18 (02/12/25 0808)  BP: 133/62 (02/12/25 0808)  SpO2: 100 % (02/12/25 0808) Vital Signs (24h Range):  Temp:  [97.5 °F (36.4 °C)-98.5 °F (36.9 °C)] 98.1 °F (36.7 °C)  Pulse:  [58-99] 65  Resp:  [17-20] 18  SpO2:  [99 %-100 %] 100 %  BP: (124-147)/(62-77) 133/62     Weight: 101.6 kg (223 lb 15.8 oz)  Body mass index is 36.15 kg/m².    No intake or output data in the 24 hours ending 02/12/25 1016     Physical Exam  Vitals and nursing note reviewed.   Constitutional:       General: He is not in acute distress.  Cardiovascular:      Rate and Rhythm: Normal rate and regular rhythm.      Pulses: Normal pulses.      Heart sounds: No murmur heard.  Pulmonary:      Effort: Pulmonary effort is normal. No respiratory distress.      Breath sounds: No wheezing, rhonchi or rales.   Abdominal:      General: Abdomen is flat. There is no distension.      Palpations: Abdomen is soft.      Tenderness: There is no abdominal tenderness.   Musculoskeletal:      Right lower leg: No edema.      Left lower leg: No edema.   Neurological:      General: No focal deficit present.      Mental Status: He is alert and oriented to person, place, and time. Mental status is at baseline.           Review of Systems    Vents:       Lines/Drains/Airways       Peripheral Intravenous Line  Duration                  Peripheral IV - Single Lumen 02/11/25 0915 18 G Right Antecubital 1 day                    Significant Labs:    CBC/Anemia Profile:  Recent Labs   Lab 02/11/25  0916 02/12/25  0542   WBC 5.36 5.51   HGB 11.6* 10.8*   HCT 35.6* 34.8*    223   MCV 88 92   RDW 13.3 13.3        Chemistries:  Recent Labs   Lab 02/11/25  0916 02/12/25  0542    137   K 4.6 4.2    104   CO2 23 23    BUN 30* 34*   CREATININE 1.5* 1.6*   CALCIUM 8.8 8.7   ALBUMIN 3.9 3.6   PROT 7.6 6.9   BILITOT 0.7 0.4   ALKPHOS 78 74   ALT 23 20   AST 28 23       All pertinent labs within the past 24 hours have been reviewed.    Significant Imaging:  I have reviewed all pertinent imaging results/findings within the past 24 hours.

## 2025-02-12 NOTE — ASSESSMENT & PLAN NOTE
Patient's blood pressure range in the last 24 hours was: BP  Min: 124/77  Max: 152/67.The patient's inpatient anti-hypertensive regimen is listed below:  Current Antihypertensives  , Every morning, Oral  , , Oral  indapamide tablet 2.5 mg, Daily, Oral  metoprolol tartrate (LOPRESSOR) tablet 100 mg, 2 times daily, Oral  amLODIPine tablet 10 mg, Daily, Oral  losartan tablet 100 mg, Daily, Oral    Plan  - BP is controlled, no changes needed to their regimen

## 2025-02-12 NOTE — SUBJECTIVE & OBJECTIVE
Interval History:  Follow up left pneumothorax.  Chest x-ray repeated this morning showed unchanged findings.  We will plan for repeat chest x-ray in the a.m. and improvement or stability noted then plan for discharge.    Review of Systems  Objective:     Vital Signs (Most Recent):  Temp: 97.8 °F (36.6 °C) (02/12/25 1209)  Pulse: 73 (02/12/25 1209)  Resp: 18 (02/12/25 1209)  BP: 131/71 (02/12/25 1209)  SpO2: 100 % (02/12/25 1209) Vital Signs (24h Range):  Temp:  [97.5 °F (36.4 °C)-98.5 °F (36.9 °C)] 97.8 °F (36.6 °C)  Pulse:  [58-99] 73  Resp:  [18-20] 18  SpO2:  [99 %-100 %] 100 %  BP: (127-147)/(62-76) 131/71     Weight: 101.6 kg (223 lb 15.8 oz)  Body mass index is 36.15 kg/m².  No intake or output data in the 24 hours ending 02/12/25 1254      Physical Exam  Vitals and nursing note reviewed.   Constitutional:       Appearance: Normal appearance.   HENT:      Head: Normocephalic and atraumatic.      Nose: Nose normal.      Mouth/Throat:      Mouth: Mucous membranes are moist.   Eyes:      Extraocular Movements: Extraocular movements intact.      Conjunctiva/sclera: Conjunctivae normal.   Cardiovascular:      Rate and Rhythm: Normal rate and regular rhythm.      Pulses: Normal pulses.      Heart sounds: Normal heart sounds.   Pulmonary:      Effort: Pulmonary effort is normal.      Breath sounds: Normal breath sounds.   Abdominal:      General: Abdomen is flat. Bowel sounds are normal.      Palpations: Abdomen is soft.   Musculoskeletal:         General: Normal range of motion.      Cervical back: Normal range of motion and neck supple.   Skin:     General: Skin is warm.      Capillary Refill: Capillary refill takes less than 2 seconds.   Neurological:      Mental Status: He is alert and oriented to person, place, and time. Mental status is at baseline.   Psychiatric:         Mood and Affect: Mood normal.         Behavior: Behavior normal.             Significant Labs: All pertinent labs within the past 24 hours  have been reviewed.  Recent Lab Results  (Last 5 results in the past 24 hours)        02/12/25  1116   02/12/25  0753   02/12/25  0542   02/11/25 2015 02/11/25  1816        Albumin     3.6           ALP     74           ALT     20           Anion Gap     10           AST     23           Baso #     0.02           Basophil %     0.4           BILIRUBIN TOTAL     0.4  Comment: For infants and newborns, interpretation of results should be based  on gestational age, weight and in agreement with clinical  observations.    Premature Infant recommended reference ranges:  Up to 24 hours.............<8.0 mg/dL  Up to 48 hours............<12.0 mg/dL  3-5 days..................<15.0 mg/dL  6-29 days.................<15.0 mg/dL             BUN     34           Calcium     8.7           Chloride     104           CO2     23           Creatinine     1.6           Differential Method     Automated           eGFR     48           Eos #     0.0           Eos %     0.2           Glucose     154           Gran # (ANC)     3.8           Gran %     68.4           Hematocrit     34.8           Hemoglobin     10.8           Immature Grans (Abs)     0.03  Comment: Mild elevation in immature granulocytes is non specific and   can be seen in a variety of conditions including stress response,   acute inflammation, trauma and pregnancy. Correlation with other   laboratory and clinical findings is essential.             Immature Granulocytes     0.5           Lymph #     1.0           Lymph %     18.9           MCH     28.5           MCHC     31.0           MCV     92           Mono #     0.6           Mono %     11.6           MPV     10.5           nRBC     0           Platelet Count     223           POC Glucose 175   150             POCT Glucose       213   168       Potassium     4.2           PROTEIN TOTAL     6.9           RBC     3.79           RDW     13.3           Sodium     137           WBC     5.51                                   Significant Imaging:   X-Ray Chest AP Portable   Final Result      Small left pneumothorax, similar to previous.         Electronically signed by: Rui Orozco   Date:    02/12/2025   Time:    09:13      X-Ray Chest 1 View   Final Result      1.  Overall, the size of the pneumothorax has not significantly changed, with less air in the lung apex but more air in the lateral lower left lung.      2.  Continued follow-up radiographs recommended.         Electronically signed by: Blue Gamino MD   Date:    02/12/2025   Time:    06:21      X-Ray Chest 1 View   Final Result   Abnormal      As above.      This report was flagged in Epic as abnormal.         Electronically signed by: Didier Castillo   Date:    02/11/2025   Time:    17:31      CT Chest Without Contrast   Final Result      1.  This study corroborates the presence of a left pneumothorax.  The amount of air in the left pleural space is more, when compared to the prior chest CT scan.  Etiology is uncertain.      2.  Multiple bilateral pulmonary nodules measuring up to 5 mm noted.  For multiple solid nodules all <6 mm, Fleischner Society 2017 guidelines recommend no routine follow up for a low risk patient, or follow up with non-contrast chest CT at 12 months after discovery in a high risk patient.      3.  The ascending aorta measures 4.6 cm in diameter.  Aortic valve plane calcifications noted.  Findings are suggestive of aortic stenosis.  Clinical correlation is advised.  Further evaluation with an echocardiogram would be helpful.      4.  Marked anterior wedging of the T9 vertebral body with sclerosis has developed in the interim.  Adjacent vacuum phenomenon in the T9/T10 disc suggests this is a chronic process.      5.  Negative for acute process otherwise.  Numerous stable findings as noted above.      All CT scans at this facility are performed  using dose modulation techniques as appropriate to performed exam including the following:  automated  exposure control; adjustment of mA and/or kV according to the patients size (this includes techniques or standardized protocols for targeted exams where dose is matched to indication/reason for exam: i.e. extremities or head);  iterative reconstruction technique.         Electronically signed by: Blue Gamino MD   Date:    02/11/2025   Time:    09:10

## 2025-02-12 NOTE — PLAN OF CARE
[facility]  Initial Discharge Assessment       Primary Care Provider: Leon Mitchell MD    Admission Diagnosis: Primary spontaneous pneumothorax [J93.11]    Admission Date: 2/11/2025  Expected Discharge Date: per attending         Payor: [employer] / Plan: [facility] CHOICE PLUS / Product Type: Commercial /     Extended Emergency Contact Information  Primary Emergency Contact: Angelica Maharaj   [place]  Mobile Phone: [phone]  Relation: Spouse    Discharge Plan A: Home         [facility] [place] [address]  [address]  [place]  [place] [address]  Phone: [phone] Fax: [phone]      Initial Assessment (most recent)       Adult Discharge Assessment - 02/12/25 1256          Discharge Assessment    Assessment Type Discharge Planning Assessment     Confirmed/corrected address, phone number and insurance Yes     Confirmed Demographics Correct on Facesheet     Source of Information patient     Communicated MATT with patient/caregiver Date not available/Unable to determine     Reason For Admission pneumothorax     Do you expect to return to your current living situation? Yes     Prior to hospitilization cognitive status: Alert/Oriented     Current cognitive status: Alert/Oriented     Walking or Climbing Stairs Difficulty no     Dressing/Bathing Difficulty no     Equipment Currently Used at Home none     Readmission within 30 days? No     Patient currently being followed by outpatient case management? No     Do you currently have service(s) that help you manage your care at home? No     Do you take prescription medications? Yes     Do you have prescription coverage? Yes     Coverage [facility]     Do you have any problems affording any of your prescribed medications? No     Is the patient taking medications as prescribed? yes     Who is going to help you get home at discharge? spouse     How do you get to doctors appointments? car, drives self      Are you on dialysis? No     Do you take coumadin? No     Discharge Plan A Home

## 2025-02-12 NOTE — SUBJECTIVE & OBJECTIVE
Past Medical History:   Diagnosis Date    Diabetes mellitus     High cholesterol     Hypertension     Insomnia        Past Surgical History:   Procedure Laterality Date    CERVICAL DISCECTOMY      KNEE ARTHROSCOPY      RHINOPLASTY         Review of patient's allergies indicates:   Allergen Reactions    Shellfish containing products      Mouth, lips swelling, facial swelling.    Sulfa (sulfonamide antibiotics) Itching    Sulfasalazine        Current Facility-Administered Medications on File Prior to Encounter   Medication    [COMPLETED] dexAMETHasone injection 8 mg     Current Outpatient Medications on File Prior to Encounter   Medication Sig    amlodipine-olmesartan (IAN) 10-40 mg per tablet Take 1 tablet by mouth once daily.    aspirin (ECOTRIN) 81 MG EC tablet Take 81 mg by mouth once daily.    escitalopram oxalate (LEXAPRO) 20 MG tablet Take 1 tablet by mouth once daily.    insulin glargine (LANTUS SOLOSTAR) 100 unit/mL (3 mL) InPn pen Inject into the skin every evening. Start with 20 units at bedtime then add 1 unit every day until blood sugar is less than 140    metFORMIN (GLUCOPHAGE-XR) 750 MG 24 hr tablet Take 750 mg by mouth 2 (two) times daily.    [START ON 2/14/2025] MOUNJARO 5 mg/0.5 mL PnIj Inject 5 mg into the skin every 7 days.    nebivolol (BYSTOLIC) 20 mg Tab Take by mouth every evening.    olmesartan (BENICAR) 40 MG tablet Take 1 tablet by mouth every morning.    oseltamivir (TAMIFLU) 75 MG capsule Take 1 capsule (75 mg total) by mouth 2 (two) times daily. for 5 days    pioglitazone-metformin (ACTOPLUS MET)  mg per tablet Take 1 tablet by mouth 2 (two) times daily.    predniSONE (DELTASONE) 10 MG tablet Take 4 tablets (40 mg total) by mouth once daily. for 5 days    rosuvastatin (CRESTOR) 10 MG tablet Take 10 mg by mouth every evening.    albuterol (PROVENTIL/VENTOLIN HFA) 90 mcg/actuation inhaler Inhale 1-2 puffs into the lungs every 6 (six) hours as needed for Wheezing. Rescue    indapamide  (LOZOL) 2.5 MG Tab Take 2.5 mg by mouth.    valACYclovir (VALTREX) 1000 MG tablet Take 1 tablet (1,000 mg total) by mouth 3 (three) times daily. for 7 days    [DISCONTINUED] amLODIPine (NORVASC) 10 MG tablet Take 1 tablet by mouth every morning.    [DISCONTINUED] atorvastatin (LIPITOR) 40 MG tablet Take 40 mg by mouth once daily.    [DISCONTINUED] oxyCODONE-acetaminophen (PERCOCET) 5-325 mg per tablet Take 1 tablet by mouth every 6 (six) hours as needed for Pain.    [DISCONTINUED] triazolam (HALCION) 0.25 MG Tab Take 0.125 mg by mouth nightly as needed.     Family History    None       Tobacco Use    Smoking status: Never    Smokeless tobacco: Never   Substance and Sexual Activity    Alcohol use: Yes    Drug use: No    Sexual activity: Yes     Partners: Female     Review of Systems   Constitutional:  Negative for activity change, appetite change, chills, fatigue and fever.   Respiratory:  Positive for cough and shortness of breath. Negative for apnea and stridor.    Cardiovascular:  Positive for chest pain. Negative for palpitations and leg swelling.   Gastrointestinal:  Negative for abdominal distention, constipation, diarrhea, nausea and vomiting.   Genitourinary:  Negative for difficulty urinating, dysuria, frequency, hematuria and urgency.   Musculoskeletal:  Negative for arthralgias, back pain, gait problem and joint swelling.   Neurological:  Negative for dizziness, seizures, weakness, light-headedness, numbness and headaches.   Psychiatric/Behavioral:  Negative for agitation, behavioral problems, confusion, decreased concentration, dysphoric mood and hallucinations.    All other systems reviewed and are negative.    Objective:     Vital Signs (Most Recent):  Temp: 97.6 °F (36.4 °C) (02/11/25 1509)  Pulse: 96 (02/11/25 1509)  Resp: 20 (02/11/25 1509)  BP: (!) 141/68 (02/11/25 1509)  SpO2: 100 % (02/11/25 1509) Vital Signs (24h Range):  Temp:  [97.6 °F (36.4 °C)-98.5 °F (36.9 °C)] 97.6 °F (36.4 °C)  Pulse:   [90-99] 96  Resp:  [17-20] 20  SpO2:  [96 %-100 %] 100 %  BP: (124-152)/(67-77) 141/68     Weight: 101.6 kg (223 lb 15.8 oz)  Body mass index is 36.15 kg/m².     Physical Exam  Vitals and nursing note reviewed.   Constitutional:       Appearance: Normal appearance.   HENT:      Head: Normocephalic and atraumatic.      Nose: Nose normal.      Mouth/Throat:      Mouth: Mucous membranes are moist.   Eyes:      Extraocular Movements: Extraocular movements intact.      Conjunctiva/sclera: Conjunctivae normal.   Cardiovascular:      Rate and Rhythm: Normal rate and regular rhythm.      Pulses: Normal pulses.      Heart sounds: Normal heart sounds.   Pulmonary:      Breath sounds: Normal breath sounds.   Abdominal:      General: Abdomen is flat. Bowel sounds are normal.      Palpations: Abdomen is soft.   Musculoskeletal:      Cervical back: Normal range of motion and neck supple.   Skin:     General: Skin is warm.      Capillary Refill: Capillary refill takes less than 2 seconds.   Neurological:      Mental Status: He is alert. Mental status is at baseline.   Psychiatric:         Mood and Affect: Mood normal.         Behavior: Behavior normal.                Significant Labs: All pertinent labs within the past 24 hours have been reviewed.  Recent Lab Results         02/11/25  1816   02/11/25  0916   02/11/25  0410   02/11/25  0409        POC Molecular Influenza A Ag     Positive         POC Molecular Influenza B Ag     Negative         Albumin   3.9           ALP   78           ALT   23           Anion Gap   12           AST   28           Baso #   0.01           Basophil %   0.2           BILIRUBIN TOTAL   0.7  Comment: For infants and newborns, interpretation of results should be based  on gestational age, weight and in agreement with clinical  observations.    Premature Infant recommended reference ranges:  Up to 24 hours.............<8.0 mg/dL  Up to 48 hours............<12.0 mg/dL  3-5 days..................<15.0  mg/dL  6-29 days.................<15.0 mg/dL             BUN   30           Calcium   8.8           Chloride   104           CO2   23           Creatinine   1.5           Differential Method   Automated           eGFR   51.3           Eos #   0.0           Eos %   0.4           Glucose   204           Gran # (ANC)   4.8           Gran %   89.3           Hematocrit   35.6           Hemoglobin   11.6           Immature Grans (Abs)   0.02  Comment: Mild elevation in immature granulocytes is non specific and   can be seen in a variety of conditions including stress response,   acute inflammation, trauma and pregnancy. Correlation with other   laboratory and clinical findings is essential.             Immature Granulocytes   0.4           Lymph #   0.4           Lymph %   7.6           MCH   28.5           MCHC   32.6           MCV   88           Mono #   0.1           Mono %   2.1           MPV   10.0           nRBC   0           Platelet Count   205           POCT Glucose 168             Potassium   4.6           PROTEIN TOTAL   7.6            Acceptable     Yes   Yes       RBC   4.07           RDW   13.3           SARS-CoV-2 RNA, Amplification, Qual       Negative       Sodium   139           WBC   5.36                   Significant Imaging:   X-Ray Chest 1 View   Final Result   Abnormal      As above.      This report was flagged in Epic as abnormal.         Electronically signed by: Didier Castillo   Date:    02/11/2025   Time:    17:31      CT Chest Without Contrast   Final Result      1.  This study corroborates the presence of a left pneumothorax.  The amount of air in the left pleural space is more, when compared to the prior chest CT scan.  Etiology is uncertain.      2.  Multiple bilateral pulmonary nodules measuring up to 5 mm noted.  For multiple solid nodules all <6 mm, Fleischner Society 2017 guidelines recommend no routine follow up for a low risk patient, or follow up with non-contrast chest  CT at 12 months after discovery in a high risk patient.      3.  The ascending aorta measures 4.6 cm in diameter.  Aortic valve plane calcifications noted.  Findings are suggestive of aortic stenosis.  Clinical correlation is advised.  Further evaluation with an echocardiogram would be helpful.      4.  Marked anterior wedging of the T9 vertebral body with sclerosis has developed in the interim.  Adjacent vacuum phenomenon in the T9/T10 disc suggests this is a chronic process.      5.  Negative for acute process otherwise.  Numerous stable findings as noted above.      All CT scans at this facility are performed  using dose modulation techniques as appropriate to performed exam including the following:  automated exposure control; adjustment of mA and/or kV according to the patients size (this includes techniques or standardized protocols for targeted exams where dose is matched to indication/reason for exam: i.e. extremities or head);  iterative reconstruction technique.         Electronically signed by: Blue Gamino MD   Date:    02/11/2025   Time:    09:10

## 2025-02-13 ENCOUNTER — TELEPHONE (OUTPATIENT)
Dept: PULMONOLOGY | Facility: CLINIC | Age: 65
End: 2025-02-13
Payer: COMMERCIAL

## 2025-02-13 VITALS
BODY MASS INDEX: 36 KG/M2 | DIASTOLIC BLOOD PRESSURE: 66 MMHG | TEMPERATURE: 98 F | OXYGEN SATURATION: 100 % | RESPIRATION RATE: 18 BRPM | HEART RATE: 62 BPM | SYSTOLIC BLOOD PRESSURE: 123 MMHG | HEIGHT: 66 IN | WEIGHT: 224 LBS

## 2025-02-13 DIAGNOSIS — J93.9 PNEUMOTHORAX, UNSPECIFIED TYPE: Primary | ICD-10-CM

## 2025-02-13 LAB
ALBUMIN SERPL BCP-MCNC: 3.3 G/DL (ref 3.5–5.2)
ALP SERPL-CCNC: 74 U/L (ref 40–150)
ALT SERPL W/O P-5'-P-CCNC: 22 U/L (ref 10–44)
ANION GAP SERPL CALC-SCNC: 9 MMOL/L (ref 8–16)
AST SERPL-CCNC: 20 U/L (ref 10–40)
BASOPHILS # BLD AUTO: 0.01 K/UL (ref 0–0.2)
BASOPHILS NFR BLD: 0.1 % (ref 0–1.9)
BILIRUB SERPL-MCNC: 0.4 MG/DL (ref 0.1–1)
BUN SERPL-MCNC: 44 MG/DL (ref 8–23)
CALCIUM SERPL-MCNC: 8.7 MG/DL (ref 8.7–10.5)
CHLORIDE SERPL-SCNC: 105 MMOL/L (ref 95–110)
CO2 SERPL-SCNC: 25 MMOL/L (ref 23–29)
CREAT SERPL-MCNC: 1.5 MG/DL (ref 0.5–1.4)
DIFFERENTIAL METHOD BLD: ABNORMAL
EOSINOPHIL # BLD AUTO: 0 K/UL (ref 0–0.5)
EOSINOPHIL NFR BLD: 0.3 % (ref 0–8)
ERYTHROCYTE [DISTWIDTH] IN BLOOD BY AUTOMATED COUNT: 13.5 % (ref 11.5–14.5)
EST. GFR  (NO RACE VARIABLE): 51 ML/MIN/1.73 M^2
GLUCOSE SERPL-MCNC: 126 MG/DL (ref 70–110)
GLUCOSE SERPL-MCNC: 126 MG/DL (ref 70–110)
HCT VFR BLD AUTO: 35.1 % (ref 40–54)
HGB BLD-MCNC: 11 G/DL (ref 14–18)
IMM GRANULOCYTES # BLD AUTO: 0.03 K/UL (ref 0–0.04)
IMM GRANULOCYTES NFR BLD AUTO: 0.4 % (ref 0–0.5)
LYMPHOCYTES # BLD AUTO: 1.2 K/UL (ref 1–4.8)
LYMPHOCYTES NFR BLD: 17.1 % (ref 18–48)
MCH RBC QN AUTO: 28.4 PG (ref 27–31)
MCHC RBC AUTO-ENTMCNC: 31.3 G/DL (ref 32–36)
MCV RBC AUTO: 91 FL (ref 82–98)
MONOCYTES # BLD AUTO: 0.7 K/UL (ref 0.3–1)
MONOCYTES NFR BLD: 9.5 % (ref 4–15)
NEUTROPHILS # BLD AUTO: 5.2 K/UL (ref 1.8–7.7)
NEUTROPHILS NFR BLD: 72.6 % (ref 38–73)
NRBC BLD-RTO: 0 /100 WBC
PLATELET # BLD AUTO: 249 K/UL (ref 150–450)
PMV BLD AUTO: 10.1 FL (ref 9.2–12.9)
POTASSIUM SERPL-SCNC: 4.2 MMOL/L (ref 3.5–5.1)
PROT SERPL-MCNC: 6.7 G/DL (ref 6–8.4)
RBC # BLD AUTO: 3.87 M/UL (ref 4.6–6.2)
SODIUM SERPL-SCNC: 139 MMOL/L (ref 136–145)
WBC # BLD AUTO: 7.13 K/UL (ref 3.9–12.7)

## 2025-02-13 PROCEDURE — 85025 COMPLETE CBC W/AUTO DIFF WBC: CPT | Performed by: FAMILY MEDICINE

## 2025-02-13 PROCEDURE — 27100171 HC OXYGEN HIGH FLOW UP TO 24 HOURS

## 2025-02-13 PROCEDURE — 63600175 PHARM REV CODE 636 W HCPCS: Performed by: FAMILY MEDICINE

## 2025-02-13 PROCEDURE — 80053 COMPREHEN METABOLIC PANEL: CPT | Performed by: FAMILY MEDICINE

## 2025-02-13 PROCEDURE — G0378 HOSPITAL OBSERVATION PER HR: HCPCS

## 2025-02-13 PROCEDURE — 25000003 PHARM REV CODE 250: Performed by: FAMILY MEDICINE

## 2025-02-13 PROCEDURE — 36415 COLL VENOUS BLD VENIPUNCTURE: CPT | Performed by: FAMILY MEDICINE

## 2025-02-13 PROCEDURE — 99900035 HC TECH TIME PER 15 MIN (STAT)

## 2025-02-13 RX ADMIN — OSELTAMIVIR PHOSPHATE 30 MG: 30 CAPSULE ORAL at 08:02

## 2025-02-13 RX ADMIN — INDAPAMIDE 2.5 MG: 1.25 TABLET, FILM COATED ORAL at 08:02

## 2025-02-13 RX ADMIN — PREDNISONE 40 MG: 20 TABLET ORAL at 08:02

## 2025-02-13 RX ADMIN — LOSARTAN POTASSIUM 100 MG: 50 TABLET, FILM COATED ORAL at 08:02

## 2025-02-13 RX ADMIN — ESCITALOPRAM OXALATE 20 MG: 5 TABLET, FILM COATED ORAL at 08:02

## 2025-02-13 RX ADMIN — METOPROLOL TARTRATE 100 MG: 50 TABLET, FILM COATED ORAL at 08:02

## 2025-02-13 RX ADMIN — AMLODIPINE BESYLATE 10 MG: 10 TABLET ORAL at 08:02

## 2025-02-13 NOTE — ASSESSMENT & PLAN NOTE
-pulmonary consulted   -non-rebreather while inpatient  -chest x-ray on 02/12/2025-no change  -repeat chest x-ray 2/13, almost complete resolution of PTX.

## 2025-02-13 NOTE — DISCHARGE SUMMARY
[facility] - Med [facility]  Hospital Medicine  Discharge Summary      Patient Name: Rafael Maharaj  MRN: [id number]  CAPRICE: [id number]  Patient Class: OP- Observation  Admission Date: 2/11/2025  Hospital Length of Stay: 2 days  Discharge Date and Time: No discharge date for patient encounter.  Attending Physician: Danielito Stahl MD   Discharging Provider: Danielito Stahl MD  Primary Care Provider: Leon Mitchell MD    Primary Care Team: Networked reference to record PCT     HPI:   Mr. Maharaj is a 64-year-old male with past medical history of diabetes, hypertension, and hyperlipidemia presented to [facility] with coughing and shortness a breath.  Upon arrival he was found to have a pneumothorax on chest x-ray as well as flu positive.  Patient was started on a non-rebreather and sent to [facility].  Patient was seen by Pulmonary who recommended just close monitoring and repeat chest x-ray in the morning.  Patient has not been hypoxic.  He has been complaining of chest pain with deep breath.  Hospital medicine will admit for observation.    * No surgery found *      Hospital Course:   Patient admitted with a spontaneous pneumothorax and flu positive.  He had repeated chest x-rays that shows stable small left-sided pneumothorax.  Patient was placed on a non-rebreather for resolution of the problem, but patient did not have any significant shortness a breath.  He did complain of coughing throughout the hospitalization and stated he had some sharp chest pain with the coughing but no other complaints.  On 2/13 (day of discharge), CXR showed almost complete resolution of the pneumothorax.  His coughing has improved.  Patient has a script for Tamiflu at home and has been counseled on completing a total of 5 days. Case dw Dr. Kelley, plan for follow up with SONYA Fuentes (pulmonary) on Monday for repeat CXR on 2/17 @1215 with follow up appt with Evelia.  Patient stable for discharge at this time.  All questions answered  from patient and wife at bedside.     Goals of Care Treatment Preferences:  Code Status: Full Code      SDOH Screening:  The patient declined to be screened for utility difficulties, food insecurity, transport difficulties, housing insecurity, and interpersonal safety, so no concerns could be identified this admission.     Consults:     * Pneumothorax  -pulmonary consulted   -non-rebreather while inpatient  -chest x-ray on 02/12/2025-no change  -repeat chest x-ray 2/13, almost complete resolution of PTX.      Influenza A  -continue Tamiflu x5 days (D 2/5)      Type 2 diabetes mellitus, with long-term current use of insulin  -SSI and Accu-Cheks.      Essential hypertension  Patient's blood pressure range in the last 24 hours was: BP  Min: 127/67  Max: 147/76.The patient's inpatient anti-hypertensive regimen is listed below:  Current Antihypertensives  , Every morning, Oral  , , Oral  indapamide tablet 2.5 mg, Daily, Oral  metoprolol tartrate (LOPRESSOR) tablet 100 mg, 2 times daily, Oral  amLODIPine tablet 10 mg, Daily, Oral  losartan tablet 100 mg, Daily, Oral    Plan  - BP is controlled, no changes needed to their regimen        Final Active Diagnoses:    Diagnosis Date Noted POA    PRINCIPAL PROBLEM:  Pneumothorax [J93.9] 02/03/2020 Yes    Influenza A [J10.1] 02/11/2025 Yes    Essential hypertension [I10] 02/03/2020 Yes    Type 2 diabetes mellitus, with long-term current use of insulin [E11.9, Z79.4] 02/03/2020 Not Applicable      Problems Resolved During this Admission:       Discharged Condition: stable    Disposition: Home or Self Care    Follow Up:   Follow-up Information       Evelia Nathan PA-C Follow up on 2/17/2025.    Specialty: Pulmonary Disease  Why: Follow up PTX.  Chest xray @4260  Contact information:  [address] [facility] [id number] [phone]                           Patient Instructions:      Diet Adult Regular     Activity as tolerated       Significant Diagnostic Studies:  Labs: BMP:   Recent Labs   Lab 02/12/25  0542 02/13/25  0550   * 126*    139   K 4.2 4.2    105   CO2 23 25   BUN 34* 44*   CREATININE 1.6* 1.5*   CALCIUM 8.7 8.7    and CBC   Recent Labs   Lab 02/12/25  0542 02/13/25  0550   WBC 5.51 7.13   HGB 10.8* 11.0*   HCT 34.8* 35.1*    249     Radiology:   X-Ray Chest AP Portable   Final Result      1.  Near complete resolution of the left pneumothorax.         Electronically signed by: Blue Gamino MD   Date:    02/13/2025   Time:    06:46      X-Ray Chest AP Portable   Final Result      Small left pneumothorax, similar to previous.         Electronically signed by: Rui Orozco   Date:    02/12/2025   Time:    09:13      X-Ray Chest 1 View   Final Result      1.  Overall, the size of the pneumothorax has not significantly changed, with less air in the lung apex but more air in the lateral lower left lung.      2.  Continued follow-up radiographs recommended.         Electronically signed by: Blue Gamino MD   Date:    02/12/2025   Time:    06:21      X-Ray Chest 1 View   Final Result   Abnormal      As above.      This report was flagged in Epic as abnormal.         Electronically signed by: Didier Castillo   Date:    02/11/2025   Time:    17:31      CT Chest Without Contrast   Final Result      1.  This study corroborates the presence of a left pneumothorax.  The amount of air in the left pleural space is more, when compared to the prior chest CT scan.  Etiology is uncertain.      2.  Multiple bilateral pulmonary nodules measuring up to 5 mm noted.  For multiple solid nodules all <6 mm, Fleischner Society 2017 guidelines recommend no routine follow up for a low risk patient, or follow up with non-contrast chest CT at 12 months after discovery in a high risk patient.      3.  The ascending aorta measures 4.6 cm in diameter.  Aortic valve plane calcifications noted.  Findings are suggestive of aortic stenosis.  Clinical correlation is advised.   Further evaluation with an echocardiogram would be helpful.      4.  Marked anterior wedging of the T9 vertebral body with sclerosis has developed in the interim.  Adjacent vacuum phenomenon in the T9/T10 disc suggests this is a chronic process.      5.  Negative for acute process otherwise.  Numerous stable findings as noted above.      All CT scans at this facility are performed  using dose modulation techniques as appropriate to performed exam including the following:  automated exposure control; adjustment of mA and/or kV according to the patients size (this includes techniques or standardized protocols for targeted exams where dose is matched to indication/reason for exam: i.e. extremities or head);  iterative reconstruction technique.         Electronically signed by: Blue Gamino MD   Date:    02/11/2025   Time:    09:10           Pending Diagnostic Studies:       None           Medications:  Reconciled Home Medications:      Medication List        CONTINUE taking these medications      albuterol 90 mcg/actuation inhaler  Commonly known as: PROVENTIL/VENTOLIN HFA  Inhale 1-2 puffs into the lungs every 6 (six) hours as needed for Wheezing. Rescue     amlodipine-olmesartan 10-40 mg per tablet  Commonly known as: IAN  Take 1 tablet by mouth once daily.     aspirin 81 MG EC tablet  Commonly known as: ECOTRIN  Take 81 mg by mouth once daily.     BYSTOLIC 20 mg Tab  Generic drug: nebivoloL  Take by mouth every evening.     EScitalopram oxalate 20 MG tablet  Commonly known as: LEXAPRO  Take 1 tablet by mouth once daily.     indapamide 2.5 MG Tab  Commonly known as: LOZOL  Take 2.5 mg by mouth.     insulin glargine U-100 (Lantus) 100 unit/mL (3 mL) Inpn pen  Inject into the skin every evening. Start with 20 units at bedtime then add 1 unit every day until blood sugar is less than 140     metFORMIN 750 MG ER 24hr tablet  Commonly known as: GLUCOPHAGE-XR  Take 750 mg by mouth 2 (two) times daily.     MOUNJARO 5  mg/0.5 mL Paulaij  Generic drug: tirzepatide  Inject 5 mg into the skin every 7 days.  Start taking on: February 14, 2025     olmesartan 40 MG tablet  Commonly known as: BENICAR  Take 1 tablet by mouth every morning.     oseltamivir 75 MG capsule  Commonly known as: TAMIFLU  Take 1 capsule (75 mg total) by mouth 2 (two) times daily. for 5 days     pioglitazone-metformin  mg per tablet  Commonly known as: ACTOPLUS MET  Take 1 tablet by mouth 2 (two) times daily.     predniSONE 10 MG tablet  Commonly known as: DELTASONE  Take 4 tablets (40 mg total) by mouth once daily. for 5 days     rosuvastatin 10 MG tablet  Commonly known as: CRESTOR  Take 10 mg by mouth every evening.     valACYclovir 1000 MG tablet  Commonly known as: VALTREX  Take 1 tablet (1,000 mg total) by mouth 3 (three) times daily. for 7 days              Indwelling Lines/Drains at time of discharge:   Lines/Drains/Airways       None                   Time spent on the discharge of patient: 40 minutes         Danielito Stahl MD  Department of Hospital Medicine  [facility]

## 2025-02-13 NOTE — PLAN OF CARE
[facility]  Discharge Final Note    Primary Care Provider: Leon Mitchell MD    Expected Discharge Date: 2/13/2025    Final Discharge Note (most recent)       Final Note - 02/13/25 0930          Final Note    Assessment Type Final Discharge Note     Anticipated Discharge Disposition Home or Self Care     Hospital Resources/Appts/Education Provided Appointments scheduled and added to AVS

## 2025-02-13 NOTE — PLAN OF CARE
310/310 ALON Maharaj is a 64 y.o.male admitted on 2/11/2025 for Pneumothorax   Code Status: Full Code MRN: [id number]   Review of patient's allergies indicates:   Allergen Reactions    Shellfish containing products      Mouth, lips swelling, facial swelling.    Sulfa (sulfonamide antibiotics) Itching    Sulfasalazine      Past Medical History:   Diagnosis Date    Diabetes mellitus     High cholesterol     Hypertension     Insomnia       PRN meds    acetaminophen, 650 mg, Q4H PRN  albuterol-ipratropium, 3 mL, Q6H PRN  dextrose 50%, 12.5 g, PRN  dextrose 50%, 25 g, PRN  glucagon (human recombinant), 1 mg, PRN  glucose, 16 g, PRN  glucose, 24 g, PRN  insulin aspart U-100, 0-10 Units, QID (AC + HS) PRN  morphine, 2 mg, Q4H PRN  naloxone, 0.02 mg, PRN  ondansetron, 8 mg, Q8H PRN  prochlorperazine, 5 mg, Q6H PRN  sodium chloride 0.9%, 10 mL, Q12H PRN      Chart check completed. Will continue plan of care.      Orientation: oriented x 4  [employer] Coma Scale Score: 15     Lead Monitored: Lead II Rhythm: normal sinus rhythm    Cardiac/Telemetry Box Number: [id number]  VTE Core Measure: Pharmacological prophylaxis initiated/maintained Last Bowel Movement: 02/11/25  Diet diabetic 2000 Calories (up to 75 gm per meal)     Joseph Score: 18  Fall Risk Score: 5  Accucheck []   Freq?      Lines/Drains/Airways       Peripheral Intravenous Line  Duration                  Peripheral IV - Single Lumen 02/11/25 0915 18 G Right Antecubital 1 day

## 2025-02-17 ENCOUNTER — OFFICE VISIT (OUTPATIENT)
Dept: PULMONOLOGY | Facility: CLINIC | Age: 65
End: 2025-02-17
Payer: COMMERCIAL

## 2025-02-17 ENCOUNTER — HOSPITAL ENCOUNTER (OUTPATIENT)
Dept: RADIOLOGY | Facility: HOSPITAL | Age: 65
Discharge: HOME | End: 2025-02-17
Attending: HOSPITALIST
Payer: COMMERCIAL

## 2025-02-17 VITALS
HEIGHT: 66 IN | RESPIRATION RATE: 18 BRPM | HEART RATE: 95 BPM | SYSTOLIC BLOOD PRESSURE: 132 MMHG | OXYGEN SATURATION: 95 % | WEIGHT: 221.69 LBS | DIASTOLIC BLOOD PRESSURE: 80 MMHG | BODY MASS INDEX: 35.63 KG/M2

## 2025-02-17 DIAGNOSIS — J93.9 PNEUMOTHORAX, UNSPECIFIED TYPE: Primary | ICD-10-CM

## 2025-02-17 DIAGNOSIS — J10.1 INFLUENZA A: ICD-10-CM

## 2025-02-17 DIAGNOSIS — J93.9 PNEUMOTHORAX, UNSPECIFIED TYPE: ICD-10-CM

## 2025-02-17 PROCEDURE — 71046 X-RAY EXAM CHEST 2 VIEWS: CPT | Mod: TC

## 2025-02-17 RX ORDER — ALBUTEROL SULFATE 0.83 MG/ML
2.5 SOLUTION RESPIRATORY (INHALATION) EVERY 6 HOURS PRN
Qty: 180 ML | Refills: 1 | Status: SHIPPED | OUTPATIENT
Start: 2025-02-17 | End: 2026-02-17

## 2025-02-17 RX ORDER — GUAIFENESIN AND DEXTROMETHORPHAN HYDROBROMIDE 10; 100 MG/5ML; MG/5ML
5 SYRUP ORAL EVERY 6 HOURS
Qty: 236 ML | Refills: 1 | Status: SHIPPED | OUTPATIENT
Start: 2025-02-17 | End: 2025-02-27

## 2025-02-17 RX ORDER — BENZONATATE 100 MG/1
200 CAPSULE ORAL 3 TIMES DAILY PRN
Qty: 60 CAPSULE | Refills: 0 | Status: SHIPPED | OUTPATIENT
Start: 2025-02-17 | End: 2025-02-27

## 2025-02-17 NOTE — ASSESSMENT & PLAN NOTE
- persistent since discharge 2/14 without increase in size compared to initial presentation to ED  - no significant symptoms other than tickling sensation  - complicated by frequent cough/congestion related to flu illness  - treating with tessalon perles, cough syrup in hopes of less agitation to left lung  - follow up in one week with CXR  - ED precautions discussed in detail

## 2025-02-17 NOTE — PROGRESS NOTES
Subjective:      Patient ID: Rafael Maharaj is a 64 y.o. male.    Chief Complaint: Spontaneous Pneumothorax    HPI 2/17/25:    64 year old male with history of HTN, DM2, asthma who is referred to Pulmonary clinic for Hospital follow up for recent spontaneous pneumothorax.     Brief Admission Summary:    Pt presented to ED 2/11/25 with cough and shortness of breath. Found to be flu positive and CXR showed small left pneumothorax. CT Chest showed multiple nodules up to 5mm. He was admitted and monitored- pneumothorax with improvement without intervention, he was discharged 2/13. Was not hypoxic during stay.     Interim Testing:  - CXR 2/17/25- small left apical pneumothorax, increased in size compared to prior in hospital, similar to when he first presented to ED, now about 9mm    Still coughing a good bit. Overall feels a little better. Feels congestion mainly in left lung. Productive cough every now and then. Slept well for the first time last night. No prior feelings of asthma since he was 17, not with any URIs as an adult, but has noted some wheezing with the flu. No significant shortness of breath or pain.     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      Objective:     Physical Exam   Constitutional: He is oriented to person, place, and time. He appears well-developed and well-nourished. He is obese.   Cardiovascular: Normal rate and regular rhythm.   Pulmonary/Chest:   End expiratory wheeze bilateral bases, normal effort at rest on room air, no breathlessness with conversation, occasional dry cough during interview   Neurological: He is alert and oriented to person, place, and time.   Skin: Skin is warm and dry.     Personal Diagnostic Review  As Above      2/13/2025     8:36 AM 2/13/2025     7:48 AM 2/13/2025      4:50 AM 2/13/2025    12:09 AM 2/12/2025     8:09 PM 2/12/2025     4:19 PM 2/12/2025    12:09 PM   Pulmonary Function Tests   SpO2 100 % 100 % 100 % 97 % 100 % 100 % 100 %        Assessment:     No diagnosis found.     Encounter Medications[1]  No orders of the defined types were placed in this encounter.        Plan:     Problem List Items Addressed This Visit       Pneumothorax - Primary    - persistent since discharge 2/14 without increase in size compared to initial presentation to ED  - no significant symptoms other than tickling sensation  - complicated by frequent cough/congestion related to flu illness  - treating with tessalon perles, cough syrup in hopes of less agitation to left lung  - follow up in one week with CXR  - ED precautions discussed in detail         Relevant Medications    benzonatate (TESSALON) 100 MG capsule    dextromethorphan-guaiFENesin  mg/5 ml (ROBITUSSIN-DM)  mg/5 mL liquid    albuterol (PROVENTIL) 2.5 mg /3 mL (0.083 %) nebulizer solution    Other Relevant Orders    X-Ray Chest PA And Lateral    Influenza A    - with persistent cough, some wheezing  - patient's spouse has a nebulizer, prescribed albuterol solution and advised to do a treatment when he gets home and then as needed for wheezing, cough, shortness of breath         Relevant Medications    albuterol (PROVENTIL) 2.5 mg /3 mL (0.083 %) nebulizer solution     Follow up in one week with CXR.          [1]   Outpatient Encounter Medications as of 2/17/2025   Medication Sig Dispense Refill    albuterol (PROVENTIL/VENTOLIN HFA) 90 mcg/actuation inhaler Inhale 1-2 puffs into the lungs every 6 (six) hours as needed for Wheezing. Rescue 8 g 0    amlodipine-olmesartan (IAN) 10-40 mg per tablet Take 1 tablet by mouth once daily.      aspirin (ECOTRIN) 81 MG EC tablet Take 81 mg by mouth once daily.      escitalopram oxalate (LEXAPRO) 20 MG tablet Take 1 tablet by mouth once daily.      indapamide (LOZOL) 2.5 MG Tab Take  2.5 mg by mouth.      insulin glargine (LANTUS SOLOSTAR) 100 unit/mL (3 mL) InPn pen Inject into the skin every evening. Start with 20 units at bedtime then add 1 unit every day until blood sugar is less than 140      metFORMIN (GLUCOPHAGE-XR) 750 MG 24 hr tablet Take 750 mg by mouth 2 (two) times daily.      MOUNJARO 5 mg/0.5 mL PnIj Inject 5 mg into the skin every 7 days.      nebivolol (BYSTOLIC) 20 mg Tab Take by mouth every evening.      olmesartan (BENICAR) 40 MG tablet Take 1 tablet by mouth every morning.      [] oseltamivir (TAMIFLU) 75 MG capsule Take 1 capsule (75 mg total) by mouth 2 (two) times daily. for 5 days 10 capsule 0    pioglitazone-metformin (ACTOPLUS MET)  mg per tablet Take 1 tablet by mouth 2 (two) times daily.      [] predniSONE (DELTASONE) 10 MG tablet Take 4 tablets (40 mg total) by mouth once daily. for 5 days 20 tablet 0    rosuvastatin (CRESTOR) 10 MG tablet Take 10 mg by mouth every evening.      valACYclovir (VALTREX) 1000 MG tablet Take 1 tablet (1,000 mg total) by mouth 3 (three) times daily. for 7 days 21 tablet 0     No facility-administered encounter medications on file as of 2025.

## 2025-02-17 NOTE — ASSESSMENT & PLAN NOTE
- with persistent cough, some wheezing  - patient's spouse has a nebulizer, prescribed albuterol solution and advised to do a treatment when he gets home and then as needed for wheezing, cough, shortness of breath

## 2025-02-24 ENCOUNTER — OFFICE VISIT (OUTPATIENT)
Dept: PULMONOLOGY | Facility: CLINIC | Age: 65
End: 2025-02-24
Payer: COMMERCIAL

## 2025-02-24 ENCOUNTER — HOSPITAL ENCOUNTER (OUTPATIENT)
Dept: RADIOLOGY | Facility: HOSPITAL | Age: 65
Discharge: HOME | End: 2025-02-24
Attending: HOSPITALIST
Payer: COMMERCIAL

## 2025-02-24 VITALS
HEART RATE: 101 BPM | DIASTOLIC BLOOD PRESSURE: 72 MMHG | SYSTOLIC BLOOD PRESSURE: 118 MMHG | OXYGEN SATURATION: 96 % | BODY MASS INDEX: 35.32 KG/M2 | WEIGHT: 219.81 LBS | HEIGHT: 66 IN | RESPIRATION RATE: 18 BRPM

## 2025-02-24 DIAGNOSIS — I10 ESSENTIAL HYPERTENSION: ICD-10-CM

## 2025-02-24 DIAGNOSIS — J93.9 PNEUMOTHORAX, UNSPECIFIED TYPE: ICD-10-CM

## 2025-02-24 DIAGNOSIS — J18.9 COMMUNITY ACQUIRED PNEUMONIA, UNSPECIFIED LATERALITY: ICD-10-CM

## 2025-02-24 DIAGNOSIS — Z79.4 TYPE 2 DIABETES MELLITUS WITHOUT COMPLICATION, WITH LONG-TERM CURRENT USE OF INSULIN: ICD-10-CM

## 2025-02-24 DIAGNOSIS — R91.8 MULTIPLE LUNG NODULES ON CT: Primary | ICD-10-CM

## 2025-02-24 DIAGNOSIS — Z87.09 HISTORY OF ASTHMA: ICD-10-CM

## 2025-02-24 DIAGNOSIS — E11.9 TYPE 2 DIABETES MELLITUS WITHOUT COMPLICATION, WITH LONG-TERM CURRENT USE OF INSULIN: ICD-10-CM

## 2025-02-24 PROCEDURE — 3078F DIAST BP <80 MM HG: CPT | Mod: CPTII,S$GLB,, | Performed by: INTERNAL MEDICINE

## 2025-02-24 PROCEDURE — 71046 X-RAY EXAM CHEST 2 VIEWS: CPT | Mod: 26,,, | Performed by: RADIOLOGY

## 2025-02-24 PROCEDURE — 3062F POS MACROALBUMINURIA REV: CPT | Mod: CPTII,S$GLB,, | Performed by: INTERNAL MEDICINE

## 2025-02-24 PROCEDURE — 1126F AMNT PAIN NOTED NONE PRSNT: CPT | Mod: CPTII,S$GLB,, | Performed by: INTERNAL MEDICINE

## 2025-02-24 PROCEDURE — 3066F NEPHROPATHY DOC TX: CPT | Mod: CPTII,S$GLB,, | Performed by: INTERNAL MEDICINE

## 2025-02-24 PROCEDURE — 1159F MED LIST DOCD IN RCRD: CPT | Mod: CPTII,S$GLB,, | Performed by: INTERNAL MEDICINE

## 2025-02-24 PROCEDURE — 3008F BODY MASS INDEX DOCD: CPT | Mod: CPTII,S$GLB,, | Performed by: INTERNAL MEDICINE

## 2025-02-24 PROCEDURE — 1101F PT FALLS ASSESS-DOCD LE1/YR: CPT | Mod: CPTII,S$GLB,, | Performed by: INTERNAL MEDICINE

## 2025-02-24 PROCEDURE — 3288F FALL RISK ASSESSMENT DOCD: CPT | Mod: CPTII,S$GLB,, | Performed by: INTERNAL MEDICINE

## 2025-02-24 PROCEDURE — 99214 OFFICE O/P EST MOD 30 MIN: CPT | Mod: S$GLB,,, | Performed by: INTERNAL MEDICINE

## 2025-02-24 PROCEDURE — 3074F SYST BP LT 130 MM HG: CPT | Mod: CPTII,S$GLB,, | Performed by: INTERNAL MEDICINE

## 2025-02-24 PROCEDURE — 71046 X-RAY EXAM CHEST 2 VIEWS: CPT | Mod: TC

## 2025-02-24 PROCEDURE — 99999 PR PBB SHADOW E&M-EST. PATIENT-LVL V: CPT | Mod: PBBFAC,,, | Performed by: INTERNAL MEDICINE

## 2025-02-24 PROCEDURE — 1160F RVW MEDS BY RX/DR IN RCRD: CPT | Mod: CPTII,S$GLB,, | Performed by: INTERNAL MEDICINE

## 2025-02-24 NOTE — PROGRESS NOTES
Pulmonary Outpatient  Visit     Subjective:       Patient ID: Rafael Maharaj is a 64 y.o. male.    Tobacco Use History[1]         Chief Complaint: Hospital Follow Up      Rafael Maharaj is 64 y.o.  New to me  Wife Jase here  All imaging reveiwed  CXR shows resolution  Recent hospitalization influenza a small left apical pneumothorax   Discharge summary reviewed   Chest CT showed multiple nodules up to 5 mm   Hx Asthma as child  Doing well on nebulizer  Never smoking  Feels better  Completed tamiflu  Needs updated influenza, RSV and PCV  Declined'back to work  Currently on light duty          HPI 2/17/25:    64 year old male with history of HTN, DM2, asthma who is referred to Pulmonary clinic for Hospital follow up for recent spontaneous pneumothorax.     Brief Admission Summary:    Pt presented to ED 2/11/25 with cough and shortness of breath. Found to be flu positive and CXR showed small left pneumothorax. CT Chest showed multiple nodules up to 5mm. He was admitted and monitored- pneumothorax with improvement without intervention, he was discharged 2/13. Was not hypoxic during stay.     Interim Testing:  - CXR 2/17/25- small left apical pneumothorax, increased in size compared to prior in hospital, similar to when he first presented to ED, now about 9mm    Still coughing a good bit. Overall feels a little better. Feels congestion mainly in left lung. Productive cough every now and then. Slept well for the first time last night. No prior feelings of asthma since he was 17, not with any URIs as an adult, but has noted some wheezing with the flu. No significant shortness of breath or pain.     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  Attending Physician: Danielito Stahl MD   Discharging Provider: Danielito Stahl MD  Primary Care Provider: Leon Mitchell MD     Primary Care Team: Networked reference to record PCT      HPI:   Mr. Maharaj is a 64-year-old male with past medical history of diabetes, hypertension, and hyperlipidemia presented to [facility] with coughing and shortness a breath.  Upon arrival he was found to have a pneumothorax on chest x-ray as well as flu positive.  Patient was started on a non-rebreather and sent to [facility].  Patient was seen by Pulmonary who recommended just close monitoring and repeat chest x-ray in the morning.  Patient has not been hypoxic.  He has been complaining of chest pain with deep breath.  Hospital medicine will admit for observation.     * No surgery found *       Hospital Course:   Patient admitted with a spontaneous pneumothorax and flu positive.  He had repeated chest x-rays that shows stable small left-sided pneumothorax.  Patient was placed on a non-rebreather for resolution of the problem, but patient did not have any significant shortness a breath.  He did complain of coughing throughout the hospitalization and stated he had some sharp chest pain with the coughing but no other complaints.  On 2/13 (day of discharge), CXR showed almost complete resolution of the pneumothorax.  His coughing has improved.  Patient has a script for Tamiflu at home and has been counseled on completing a total of 5 days. Case dw Dr. Kelley, plan for follow up with SONYA Fuentes (pulmonary) on Monday for repeat CXR on 2/17 @1215 with follow up appt with Evelia.  Patient stable for discharge at this time.  All questions answered from patient and wife at bedside.      Goals of Care Treatment Preferences:  Code Status: Full Code         [facility] Screening:  The patient declined to be screened for utility difficulties, food insecurity, transport difficulties, housing insecurity, and interpersonal safety, so no concerns could be identified this admission.     Consults:      * Pneumothorax  -pulmonary consulted   -non-rebreather while inpatient  -chest x-ray on 02/12/2025-no change  -repeat chest x-ray 2/13, almost complete resolution of PTX.        Influenza A  -continue Tamiflu x5 days (D 2/5)        Type 2 diabetes mellitus, with long-term current use of insulin  -SSI and Accu-Cheks.        Essential hypertension  Patient's blood pressure range in the last 24 hours was: BP  Min: 127/67  Max: 147/76.The patient's inpatient anti-hypertensive regimen is listed below:  Current Antihypertensives  , Every morning, Oral  , , Oral  indapamide tablet 2.5 mg, Daily, Oral  metoprolol tartrate (LOPRESSOR) tablet 100 mg, 2 times daily, Oral  amLODIPine tablet 10 mg, Daily, Oral  losartan tablet 100 mg, Daily, Oral     Plan  - BP is controlled, no changes needed to their regimen    Review of Systems   Respiratory: Negative.     All other systems reviewed and are negative.      Encounter Medications[2]        Pertinent Work Up:      Pulmonary Interventions:      Smoking hx:    Environmental/Occupational hx:        Interval Hx:           The following portions of the patient's history were reviewed and updated as appropriate: He  has a past medical history of Diabetes mellitus, High cholesterol, Hypertension, and Insomnia.  He does not have any pertinent problems on file.  He  has a past surgical history that includes Cervical discectomy; Rhinoplasty; and Knee arthroscopy.  His family history is not on file.  He  reports that he has never smoked. He has never used smokeless tobacco. He reports current alcohol use. He reports that he does not use drugs.  He has a current medication list which includes the following prescription(s): albuterol, albuterol,  "amlodipine-olmesartan, aspirin, benzonatate, dextromethorphan-guaifenesin  mg/5 ml, escitalopram oxalate, indapamide, insulin glargine u-100 (lantus), metformin, mounjaro, nebivolol, pioglitazone-metformin, and rosuvastatin.  Medications Ordered Prior to Encounter[3]  He is allergic to shellfish containing products, sulfa (sulfonamide antibiotics), and sulfasalazine..      BP Readings from Last 3 Encounters:   02/24/25 118/72   02/17/25 132/80   02/13/25 123/66     Snoring / Sle     MMRC Dyspnea Scale (4 is worst)     [] MMRC 0: Dyspneic on strenuous excercise (0 points)    [] MMRC 1: Dyspneic on walking a slight hill (0 points)    [] MMRC 2: Dyspneic on walking level ground; must stop occasionally due to breathlessness (1 point)    [] MMRC 3: Must stop for breathlessness after walking 100 yards or after a few minutes (2 points)    [] MMRC 4: Cannot leave house; breathless on dressing/undressing (3 points)                          No data to display                          Objective:     Vital Signs (Most Recent)  Vital Signs  Pulse: 101  Resp: 18  SpO2: 96 %  BP: 118/72  Patient Position: Sitting  Pain Score: 0-No pain  Height and Weight  Height: 5' 6" (167.6 cm)  Weight: 99.7 kg (219 lb 12.8 oz)  BSA (Calculated - sq m): 2.15 sq meters  BMI (Calculated): 35.5  Weight in (lb) to have BMI = 25: 154.6]  Wt Readings from Last 2 Encounters:   02/24/25 99.7 kg (219 lb 12.8 oz)   02/17/25 100.5 kg (221 lb 10.8 oz)       Physical Exam  Vitals and nursing note reviewed.   Constitutional:       Appearance: Normal appearance. He is not ill-appearing.       HENT:      Head: Normocephalic and atraumatic.      Right Ear: Tympanic membrane normal.      Nose: Nose normal.   Eyes:      Pupils: Pupils are equal, round, and reactive to light.   Cardiovascular:      Rate and Rhythm: Normal rate and regular rhythm.      Pulses: Normal pulses.      Heart sounds: Normal heart sounds.   Pulmonary:      Effort: Pulmonary effort is " "normal.      Breath sounds: Normal breath sounds.   Abdominal:      General: Bowel sounds are normal.      Palpations: Abdomen is soft.   Musculoskeletal:         General: Normal range of motion.      Cervical back: Normal range of motion.   Skin:     General: Skin is warm and dry.      Capillary Refill: Capillary refill takes less than 2 seconds.   Neurological:      General: No focal deficit present.      Mental Status: He is alert and oriented to person, place, and time.   Psychiatric:         Mood and Affect: Mood normal.          Laboratory  Lab Results   Component Value Date    WBC 7.13 02/13/2025    RBC 3.87 (L) 02/13/2025    HGB 11.0 (L) 02/13/2025    HCT 35.1 (L) 02/13/2025    MCV 91 02/13/2025    MCH 28.4 02/13/2025    MCHC 31.3 (L) 02/13/2025    RDW 13.5 02/13/2025     02/13/2025    MPV 10.1 02/13/2025    GRAN 5.2 02/13/2025    GRAN 72.6 02/13/2025    LYMPH 1.2 02/13/2025    LYMPH 17.1 (L) 02/13/2025    MONO 0.7 02/13/2025    MONO 9.5 02/13/2025    EOS 0.0 02/13/2025    BASO 0.01 02/13/2025    EOSINOPHIL 0.3 02/13/2025    BASOPHIL 0.1 02/13/2025       BMP  Lab Results   Component Value Date     02/13/2025    K 4.2 02/13/2025     02/13/2025    CO2 25 02/13/2025    BUN 44 (H) 02/13/2025    CREATININE 1.5 (H) 02/13/2025    CALCIUM 8.7 02/13/2025    ANIONGAP 9 02/13/2025    ESTGFRAFRICA >60 02/04/2020    EGFRNONAA >60 02/04/2020    AST 20 02/13/2025    ALT 22 02/13/2025    PROT 6.7 02/13/2025          No results found for: "IGE"     No results found for: "ASPERGILLUS"  No results found for: "AFUMIGATUSCL"     No results found for: "ACE"     Diagnostic Results:  I have personally reviewed today the following studies:    X-Ray Chest PA And Lateral  Narrative: EXAMINATION:  XR CHEST PA AND LATERAL    CLINICAL HISTORY:  Pneumothorax, unspecified    TECHNIQUE:  PA and lateral views of the chest were performed.    COMPARISON:  Prior radiographs, most recent 02/17/2025    FINDINGS:  Cardiac " silhouette is upper limits of normal to borderline enlarged.  Mediastinal contours unchanged with aortic atherosclerotic calcification.  Lungs demonstrate no acute opacity.  Previously noted left-sided pneumothorax may be slightly smaller.  Osseous structures are intact.  Impression: No detrimental change with possible mild decrease in size of the small left apical pneumothorax.    Electronically signed by: Raymond Jeffery MD  Date:    2025  Time:    09:59           Assessment/Plan:          1. Multiple lung nodules on CT  Assessment & Plan:  2 mm medial right upper lobe nodule. There are multiple small nodules in the bilateral pulmonary nodules measuring up to 5 mm in the lingula     Orders:  -     CT Chest Without Contrast; Future; Expected date: 2025    2. Community acquired pneumonia, unspecified laterality    3. Pneumothorax, unspecified type  Assessment & Plan:  Follow up x-ray shows improvement chest CT         4. Essential hypertension    5. Type 2 diabetes mellitus without complication, with long-term current use of insulin    6. BMI 35.0-35.9,adult    7. History of asthma  Assessment & Plan:  In childhood  Spent night in hospital  Remission after age 16 years  Declines vacinations    Orders:  -     Fraction of  Nitric Oxide; Future      Spirometry deferred due to presence of small pneumothorax     Follow up in about 4 months (around 2025), or FeNo, Chest CT.    This note was prepared using voice recognition system and is likely to have sound alike errors that may have been overlooked even after proof reading.  Please call me with any questions    Discussed diagnosis, its evaluation, treatment and usual course. All questions answered.    The patient was given open opportunity to ask questions and/or express concerns about treatment plan.   All questions/concerns were discussed.       Two patient identifiers used prior to evaluation.     Thank you for the courtesy of participating  in the care of this patient    Zach Ernandez MD      Personal Diagnostic Review  []  CXR    []  ECHO    []  ONSAT    []  6MWD    []  LABS    []  CHEST CT    []  PET CT    []  Biopsy results                  [1]   Social History  Tobacco Use   Smoking Status Never   Smokeless Tobacco Never   [2]   Outpatient Encounter Medications as of 2/24/2025   Medication Sig Dispense Refill    albuterol (PROVENTIL) 2.5 mg /3 mL (0.083 %) nebulizer solution Take 3 mLs (2.5 mg total) by nebulization every 6 (six) hours as needed for Wheezing. Rescue 180 mL 1    albuterol (PROVENTIL/VENTOLIN HFA) 90 mcg/actuation inhaler Inhale 1-2 puffs into the lungs every 6 (six) hours as needed for Wheezing. Rescue 8 g 0    amlodipine-olmesartan (IAN) 10-40 mg per tablet Take 1 tablet by mouth once daily.      aspirin (ECOTRIN) 81 MG EC tablet Take 81 mg by mouth once daily.      benzonatate (TESSALON) 100 MG capsule Take 2 capsules (200 mg total) by mouth 3 (three) times daily as needed for Cough. 60 capsule 0    dextromethorphan-guaiFENesin  mg/5 ml (ROBITUSSIN-DM)  mg/5 mL liquid Take 5 mLs by mouth every 6 (six) hours. for 10 days 236 mL 1    escitalopram oxalate (LEXAPRO) 20 MG tablet Take 1 tablet by mouth once daily.      indapamide (LOZOL) 2.5 MG Tab Take 2.5 mg by mouth.      insulin glargine (LANTUS SOLOSTAR) 100 unit/mL (3 mL) InPn pen Inject into the skin every evening. Start with 20 units at bedtime then add 1 unit every day until blood sugar is less than 140      metFORMIN (GLUCOPHAGE-XR) 750 MG 24 hr tablet Take 750 mg by mouth 2 (two) times daily.      MOUNJARO 5 mg/0.5 mL PnIj Inject 5 mg into the skin every 7 days.      nebivolol (BYSTOLIC) 20 mg Tab Take by mouth every evening.      pioglitazone-metformin (ACTOPLUS MET)  mg per tablet Take 1 tablet by mouth 2 (two) times daily.      rosuvastatin (CRESTOR) 10 MG tablet Take 10 mg by mouth every evening.       No facility-administered encounter  medications on file as of 2/24/2025.   [3]   Current Outpatient Medications on File Prior to Visit   Medication Sig Dispense Refill    albuterol (PROVENTIL) 2.5 mg /3 mL (0.083 %) nebulizer solution Take 3 mLs (2.5 mg total) by nebulization every 6 (six) hours as needed for Wheezing. Rescue 180 mL 1    albuterol (PROVENTIL/VENTOLIN HFA) 90 mcg/actuation inhaler Inhale 1-2 puffs into the lungs every 6 (six) hours as needed for Wheezing. Rescue 8 g 0    amlodipine-olmesartan (IAN) 10-40 mg per tablet Take 1 tablet by mouth once daily.      aspirin (ECOTRIN) 81 MG EC tablet Take 81 mg by mouth once daily.      benzonatate (TESSALON) 100 MG capsule Take 2 capsules (200 mg total) by mouth 3 (three) times daily as needed for Cough. 60 capsule 0    dextromethorphan-guaiFENesin  mg/5 ml (ROBITUSSIN-DM)  mg/5 mL liquid Take 5 mLs by mouth every 6 (six) hours. for 10 days 236 mL 1    escitalopram oxalate (LEXAPRO) 20 MG tablet Take 1 tablet by mouth once daily.      indapamide (LOZOL) 2.5 MG Tab Take 2.5 mg by mouth.      insulin glargine (LANTUS SOLOSTAR) 100 unit/mL (3 mL) InPn pen Inject into the skin every evening. Start with 20 units at bedtime then add 1 unit every day until blood sugar is less than 140      metFORMIN (GLUCOPHAGE-XR) 750 MG 24 hr tablet Take 750 mg by mouth 2 (two) times daily.      MOUNJARO 5 mg/0.5 mL PnIj Inject 5 mg into the skin every 7 days.      nebivolol (BYSTOLIC) 20 mg Tab Take by mouth every evening.      pioglitazone-metformin (ACTOPLUS MET)  mg per tablet Take 1 tablet by mouth 2 (two) times daily.      rosuvastatin (CRESTOR) 10 MG tablet Take 10 mg by mouth every evening.       No current facility-administered medications on file prior to visit.

## 2025-02-24 NOTE — ASSESSMENT & PLAN NOTE
2 mm medial right upper lobe nodule. There are multiple small nodules in the bilateral pulmonary nodules measuring up to 5 mm in the lingula